# Patient Record
Sex: FEMALE | Race: WHITE | NOT HISPANIC OR LATINO | Employment: FULL TIME | URBAN - METROPOLITAN AREA
[De-identification: names, ages, dates, MRNs, and addresses within clinical notes are randomized per-mention and may not be internally consistent; named-entity substitution may affect disease eponyms.]

---

## 2017-01-11 RX ORDER — TIZANIDINE HYDROCHLORIDE 4 MG/1
4 CAPSULE, GELATIN COATED ORAL 2 TIMES DAILY PRN
Qty: 180 CAPSULE | Refills: 3 | Status: SHIPPED | OUTPATIENT
Start: 2017-01-11 | End: 2017-01-21

## 2017-06-19 ENCOUNTER — TELEPHONE (OUTPATIENT)
Dept: FAMILY MEDICINE | Facility: CLINIC | Age: 52
End: 2017-06-19

## 2017-06-19 RX ORDER — ZOLPIDEM TARTRATE 5 MG/1
5 TABLET ORAL NIGHTLY PRN
Qty: 90 TABLET | Refills: 1 | Status: SHIPPED | OUTPATIENT
Start: 2017-06-19 | End: 2017-08-29 | Stop reason: SDUPTHER

## 2017-08-29 RX ORDER — ZOLPIDEM TARTRATE 5 MG/1
TABLET ORAL
Qty: 15 TABLET | Refills: 5 | Status: SHIPPED | OUTPATIENT
Start: 2017-08-29 | End: 2018-04-18 | Stop reason: SDUPTHER

## 2017-10-04 ENCOUNTER — OFFICE VISIT (OUTPATIENT)
Dept: FAMILY MEDICINE | Facility: CLINIC | Age: 52
End: 2017-10-04
Payer: COMMERCIAL

## 2017-10-04 VITALS
WEIGHT: 152.63 LBS | SYSTOLIC BLOOD PRESSURE: 110 MMHG | BODY MASS INDEX: 27.04 KG/M2 | HEIGHT: 63 IN | DIASTOLIC BLOOD PRESSURE: 80 MMHG | OXYGEN SATURATION: 98 % | HEART RATE: 80 BPM

## 2017-10-04 DIAGNOSIS — M79.7 FIBROMYALGIA: ICD-10-CM

## 2017-10-04 DIAGNOSIS — Z11.59 NEED FOR HEPATITIS C SCREENING TEST: ICD-10-CM

## 2017-10-04 DIAGNOSIS — M19.90 ARTHRITIS: Primary | ICD-10-CM

## 2017-10-04 PROCEDURE — 99214 OFFICE O/P EST MOD 30 MIN: CPT | Mod: S$GLB,,,

## 2017-10-04 PROCEDURE — 99999 PR PBB SHADOW E&M-EST. PATIENT-LVL III: CPT | Mod: PBBFAC,,,

## 2017-10-04 RX ORDER — DULOXETIN HYDROCHLORIDE 20 MG/1
20 CAPSULE, DELAYED RELEASE ORAL DAILY
Qty: 30 CAPSULE | Refills: 11 | Status: SHIPPED | OUTPATIENT
Start: 2017-10-04 | End: 2017-12-05

## 2017-10-04 RX ORDER — SUMATRIPTAN SUCCINATE 100 MG/1
TABLET ORAL
Qty: 9 TABLET | Refills: 11 | Status: SHIPPED | OUTPATIENT
Start: 2017-10-04 | End: 2021-04-01 | Stop reason: SDUPTHER

## 2017-10-04 NOTE — PROGRESS NOTES
Subjective:       Patient ID: Yasmine Villa is a 51 y.o. female.    Chief Complaint: Anxiety and Depression    HPI  The patient is complaining anxiety and depression symptoms. She is having a hard time adjusting to her new job. No change in her personal life.   Review of Systems   Constitutional: Positive for diaphoresis, fatigue and unexpected weight change.   Cardiovascular: Positive for palpitations.   Genitourinary:        Premenstrual    Musculoskeletal: Positive for joint swelling, myalgias and neck pain.        Ankle knees wrist   Neurological: Positive for headaches.   Psychiatric/Behavioral: Positive for sleep disturbance.       Objective:      Vitals:    10/04/17 0724   BP: 110/80   Pulse: 80     Physical Exam   Constitutional: She is oriented to person, place, and time. She appears well-developed and well-nourished. She is active.  Non-toxic appearance. She does not have a sickly appearance. She does not appear ill. No distress.   HENT:   Head: Normocephalic and atraumatic.   Right Ear: External ear normal.   Left Ear: External ear normal.   Nose: Nose normal.   Hearing normal.    Eyes: Conjunctivae are normal. Pupils are equal, round, and reactive to light.   Neck: Normal range of motion. Neck supple. No thyroid mass and no thyromegaly present.   Cardiovascular: Normal rate, regular rhythm, normal heart sounds and intact distal pulses.  Exam reveals no gallop and no friction rub.    No murmur heard.  Pulses:       Dorsalis pedis pulses are 2+ on the right side, and 2+ on the left side.        Posterior tibial pulses are 2+ on the right side, and 2+ on the left side.   Pulmonary/Chest: Effort normal and breath sounds normal. No respiratory distress. She exhibits no tenderness.   Musculoskeletal: Normal range of motion. She exhibits no edema.   Lymphadenopathy:     She has no cervical adenopathy.   Neurological: She is alert and oriented to person, place, and time. She has normal strength. Coordination and  gait normal.   Skin: Skin is warm and dry. She is not diaphoretic. No pallor.   Psychiatric: She has a normal mood and affect. Her speech is normal and behavior is normal. Judgment and thought content normal. Cognition and memory are normal.   Vitals reviewed.      Assessment:       1. Arthritis    2. Need for hepatitis C screening test    3. Fibromyalgia        Plan:       Arthritis  -     CLARI; Future; Expected date: 10/04/2017  -     CBC auto differential; Future; Expected date: 10/04/2017  -     Comprehensive metabolic panel; Future; Expected date: 10/04/2017  -     Uric acid; Future; Expected date: 10/04/2017  -     Sedimentation rate, manual; Future; Expected date: 10/04/2017  -     Lipid panel; Future; Expected date: 10/04/2017  -     Rheumatoid factor; Future; Expected date: 10/04/2017    Need for hepatitis C screening test  -     Hepatitis C antibody; Future; Expected date: 10/04/2017    Fibromyalgia    Other orders  -     duloxetine (CYMBALTA) 20 MG capsule; Take 1 capsule (20 mg total) by mouth once daily.  Dispense: 30 capsule; Refill: 11      Return if symptoms worsen or fail to improve.

## 2017-10-04 NOTE — PATIENT INSTRUCTIONS
Fibromyalgia  Fibromyalgia is a chronic condition. It causes pain and tenderness in connective tissues. This causes muscle pain. Often, there are also many tender areas throughout the body. Symptoms may also include stiffness and feelings of numbness and tingling. Symptoms may be worse upon waking up. They may increase with poor sleep, heavy activity, cold or damp weather, anxiety, or stress.  People with fibromyalgia often feel tired. They may have trouble sleeping. Other symptoms include morning stiffness, headaches, and painful menstrual periods. Some people have problems with thinking clearly and changes in memory.  The cause of fibromyalgia is not known. Symptoms are similar to that of other diseases. These include rheumatoid arthritis, low thyroid, chronic fatigue syndrome, and Lyme disease. In some cases, these diseases may occur together.  Fibromyalgia is often treated with medicines. You and your healthcare provider can discuss the medicine that may work best for you. You may have to try more than one medicine or combination of medicines before you find what works for you.  Home care  · If your healthcare provider has prescribed or recommended medicines, take them as directed.  · Rest as needed. Try to get enough sleep. If you have trouble sleeping, discuss this with your healthcare provider.   · Be active. Regular exercise can help manage symptoms. Some options include walking, swimming, and biking. Strengthening exercises may also be helpful. Talk to your healthcare provider about the best ways to be active.  · Follow a healthy diet. Limit caffeine and alcohol. If you smoke, ask your healthcare provider for help to stop.  · Notice how your body reacts to stress. Learn to listen to your body signals. This will help you take action before the stress becomes severe.  · Learn relaxation techniques. Also consider joining a stress reduction program or class.  · Talk to your healthcare provider about trying  complementary treatments. These include acupuncture, hypnosis, and biofeedback. Yoga and tu chi may be helpful.  · Ask your healthcare provider about cognitive behavioral therapy (CBT). This type of counseling can help people with fibromyalgia cope better with their illness.  Follow-up care  Follow up with your healthcare provider or as advised by our staff. In many cases, fibromyalgia is best treated with a team approach.  This may involve your primary care provider, a rheumatologist, a physical therapist, and a mental health professional.   For more information: [employer] of Arth[employer] and Musculoskeletal and Skin Diseases (NIAMS)  www.niams.nih.gov 264-498-2604  When to seek medical advice  Contact your healthcare provider right away if any of these occur:  · Symptoms getting worse or new symptoms developing  · You feel hopeless, helpless, or lose interest in day-to-day life  Date Last Reviewed: 3/1/2017  © 1248-9939 [employer]. [address] [place] [address]. All rights reserved. This information is not intended as a substitute for professional medical care. Always follow your healthcare professional's instructions.        Managing Fibromyalgia    Fibromyalgia is a chronic illness that causes pain in specific points on the body, stiffness, and fatigue. But it doesnt have to keep you from doing what you enjoy. You can feel better. You and your healthcare provider can work together to develop a plan.  Take medications as directed  You may be given medications to help reduce pain and improve sleep.  Several medications are approved to treat fibromyalgia. Two were originally designed to treat depression. They are duloxetine, and milnacipran. A third, called pregaballin, was developed to treat nerve pain. Other medications include pain relievers such as acetaminophen or stronger narcotics. These may be prescribed short term.  NSAIDs (non-steroidal anti-inflammatory drugs) include  aspirin, ibuprofen and naproxen are used to relieve pain.  Get exercise  Gentle exercise can help lessen your pain. Try these tips:  · Choose activities that are gentle on your joints, such as walking, biking, and swimming or other water exercises.  · Dont push yourself too hard. Build up your strength and endurance slowly, over time.  · Stick to it. For the most relief, exercise should become part of your daily life.  Get a good nights rest  To help you get more sleep, try the tips below:  · Sleep only in a bed, not on a couch or chair.  · Dont watch TV, read, or work in bed.  · Go to bed and get up at the same time each day.  · Try to avoid naps.  · Avoid alcohol, caffeine, and tobacco for at least 3 hours before going to bed.  · Avoid fluids in the evening to avoid having to get up to urinate.  Other things you can do  No one knows what causes fibromyalgia. But stress, poor eating habits, and extra weight can make it worse. These tips may help you feel better:  · Eat a balanced diet with plenty of fruits and vegetables, whole grains, lean protein, and low-fat or nonfat dairy products.  · Maintain a healthy weight.  · Learn ways to reduce or manage the stress in your life.  · Ask your healthcare provider for resources to help you make changes. Or check out the resources below.  Resources  · Arthritis Foundation  www.arthritis.org  · National Fibromyalgia Association  www.fmaware.org  · American Fibromyalgia Syndrome Association  www.afsafund.org  Date Last Reviewed: 2/14/2016 © 2000-2017 [employer]. [address] [place] [address], [place] [address]. All rights reserved. This information is not intended as a substitute for professional medical care. Always follow your healthcare professional's instructions.        Understanding Fibromyalgia     People with fibromyalgia tend to have at least 11 of the 18 tender points shown above.     Fibromyalgia is a condition that causes pain at specific points on the  body, stiffness, and fatigue.  What are the symptoms of fibromyalgia?  In most cases, you will have tender points on your body. These points are very sore, especially when touched. Finding several of these points helps your healthcare provider diagnose fibromyalgia.  Along with the tender points, you may have some or all of the following symptoms:  · Constant tiredness (fatigue), even after a full nights sleep (non-restorative sleep)  · A burning or throbbing pain in many parts of the body (this pain may vary during the day)  · Stiffness or aching all over your body  · Numbness or tingling in your arms and legs  · Trouble sleeping  · Bowel problems (bloating, diarrhea, constipation)  · Headaches  · Depression  How is fibromyalgia diagnosed?  There is no lab test to diagnose fibromyalgia. Instead, your healthcare provider will take your health history and examine your joints and muscles. Certain criteria are used when diagnosing fibromyalgia. Symptoms need to be present for at least 3 months. Tests may be done to rule out other conditions with similar symptoms. Then, together you can design a plan to help you manage your symptoms.   How is fibromyalgia treated?  Several medications are approved to treat fibromyalgia. Two were originally made to treat depression. They are duloxetine, and milnacipran. A third, called pregaballin, was developed to treat nerve pain. Certain medicines used to treat depression have been helpful with fibromyalgia. Other medications include pain relievers such as acetaminophen or stronger narcotics. These may be prescribed short term.  NSAIDs (nonsteroidal anti-inflammatory drugs) including aspirin, ibuprofen, and naproxen are used to relieve pain.  Getting enough sleep, regular exercise, and eating a healthy diet can help manage symptoms. Cognitive behavioral therapy (a form of psychotherapy) can be helpful for fibromyalgia. Some people find alternative treatments such as massage,  chiropractic treatments, biofeedback, or acupuncture also help with symptoms.  Date Last Reviewed: 2/14/2016  © 6430-8401 [employer]. [address] [place] [address], [place] [address]. All rights reserved. This information is not intended as a substitute for professional medical care. Always follow your healthcare professional's instructions.

## 2017-12-05 ENCOUNTER — PATIENT MESSAGE (OUTPATIENT)
Dept: FAMILY MEDICINE | Facility: CLINIC | Age: 52
End: 2017-12-05

## 2017-12-05 RX ORDER — DULOXETIN HYDROCHLORIDE 30 MG/1
30 CAPSULE, DELAYED RELEASE ORAL DAILY
Qty: 30 CAPSULE | Refills: 11 | Status: SHIPPED | OUTPATIENT
Start: 2017-12-05 | End: 2020-09-23

## 2018-04-18 RX ORDER — ZOLPIDEM TARTRATE 5 MG/1
TABLET ORAL
Qty: 15 TABLET | Refills: 5 | Status: SHIPPED | OUTPATIENT
Start: 2018-04-18 | End: 2020-09-23

## 2018-05-24 ENCOUNTER — OFFICE VISIT (OUTPATIENT)
Dept: URGENT CARE | Facility: CLINIC | Age: 53
End: 2018-05-24
Payer: COMMERCIAL

## 2018-05-24 VITALS
HEIGHT: 63 IN | WEIGHT: 152 LBS | OXYGEN SATURATION: 99 % | SYSTOLIC BLOOD PRESSURE: 139 MMHG | TEMPERATURE: 99 F | RESPIRATION RATE: 18 BRPM | DIASTOLIC BLOOD PRESSURE: 88 MMHG | HEART RATE: 85 BPM | BODY MASS INDEX: 26.93 KG/M2

## 2018-05-24 DIAGNOSIS — Z76.89 ESTABLISHING CARE WITH NEW DOCTOR, ENCOUNTER FOR: ICD-10-CM

## 2018-05-24 DIAGNOSIS — J32.9 SINUSITIS, UNSPECIFIED CHRONICITY, UNSPECIFIED LOCATION: Primary | ICD-10-CM

## 2018-05-24 PROCEDURE — 3008F BODY MASS INDEX DOCD: CPT | Mod: CPTII,S$GLB,, | Performed by: NURSE PRACTITIONER

## 2018-05-24 PROCEDURE — 99214 OFFICE O/P EST MOD 30 MIN: CPT | Mod: SA,25,S$GLB, | Performed by: NURSE PRACTITIONER

## 2018-05-24 PROCEDURE — 96372 THER/PROPH/DIAG INJ SC/IM: CPT | Mod: S$GLB,,, | Performed by: FAMILY MEDICINE

## 2018-05-24 RX ORDER — FLUTICASONE PROPIONATE 50 MCG
1 SPRAY, SUSPENSION (ML) NASAL DAILY
Qty: 16 G | Refills: 1 | Status: SHIPPED | OUTPATIENT
Start: 2018-05-24 | End: 2020-10-01

## 2018-05-24 RX ORDER — AZITHROMYCIN 250 MG/1
TABLET, FILM COATED ORAL
Qty: 6 TABLET | Refills: 0 | Status: SHIPPED | OUTPATIENT
Start: 2018-05-24 | End: 2020-09-23

## 2018-05-24 RX ORDER — BETAMETHASONE SODIUM PHOSPHATE AND BETAMETHASONE ACETATE 3; 3 MG/ML; MG/ML
6 INJECTION, SUSPENSION INTRA-ARTICULAR; INTRALESIONAL; INTRAMUSCULAR; SOFT TISSUE
Status: COMPLETED | OUTPATIENT
Start: 2018-05-24 | End: 2018-05-24

## 2018-05-24 RX ORDER — AZELASTINE 1 MG/ML
1 SPRAY, METERED NASAL 2 TIMES DAILY
Qty: 30 ML | Refills: 1 | Status: SHIPPED | OUTPATIENT
Start: 2018-05-24 | End: 2020-09-23

## 2018-05-24 RX ORDER — IPRATROPIUM BROMIDE 21 UG/1
2 SPRAY, METERED NASAL 3 TIMES DAILY PRN
Qty: 30 ML | Refills: 0 | Status: SHIPPED | OUTPATIENT
Start: 2018-05-24 | End: 2018-05-28

## 2018-05-24 RX ADMIN — BETAMETHASONE SODIUM PHOSPHATE AND BETAMETHASONE ACETATE 6 MG: 3; 3 INJECTION, SUSPENSION INTRA-ARTICULAR; INTRALESIONAL; INTRAMUSCULAR; SOFT TISSUE at 09:05

## 2018-05-24 NOTE — PATIENT INSTRUCTIONS
"Please follow up with your Primary care provider within 2-5 days if your signs and symptoms have not resolved or worsen.  The usual course of cold symptoms are 10-14 days.     If your condition worsens or fails to improve we recommend that you receive another evaluation at the emergency room immediately or contact your primary medical clinic to discuss your concerns.     You must understand that you have received an Urgent Care treatment only and that you may be released before all of your medical problems are known or treated.   You, the patient, will arrange for follow up care as instructed.     Tylenol or Ibuprofen can also be used as directed for pain/fever unless you have an allergy to them or medical condition such as stomach ulcers, kidney or liver disease or blood thinners etc for which you should not be taking these type of medications.     Take over the counter cough medication as directed as needed for cough.  You should avoid medications with pseudoephedrine or phenylephrine (any medication with "D") if you have high blood pressure as this can cause an elevation in your blood pressure. Instead consider Corcidin HBP as needed to prevent an elevated blood pressure.     Natural remedies of symptoms (as needed) include humidification, saline nasal sprays, and/or steamy showers.  Increase fluids, warm tea with honey, cough drops as needed.  You may also use salt water gargles for sore throat.    IF you received a steroid shot today - As discussed, this can elevate your blood pressure, elevate your blood sugar, water weight gain, nervous energy, redness to the face and dimpling of the skin at the injection site.       Allergic Rhinitis  Allergic rhinitis is an allergic reaction that affects the nose, and often the eyes. Its often known as nasal allergies. Nasal allergies are often due to things in the environment that are breathed in. Depending what you are sensitive to, nasal allergies may occur only during " certain seasons. Or they may occur year round. Common indoor allergens include house dust mites, mold, cockroaches, and pet dander. Outdoor allergens include pollen from trees, grasses, and weeds.   Symptoms include a drippy, stuffy, and itchy nose. They also include sneezing and red and itchy eyes. You may feel tired more often. Severe allergies may also affect your breathing and trigger a condition called asthma.   Tests can be done to see what allergens are affecting you. You may be referred to an allergy specialist for testing and further evaluation.  Home care  Your healthcare provider may prescribe medicines to help relieve allergy symptoms. These may include oral medicines, nasal sprays, or eye drops.  Ask your provider for advice on how to avoid substances that you are allergic to. Below are a few tips for each type of allergen.  Pet dander:  · Do not have pets with fur and feathers.  · If you can't avoid having a pet, keep it out of your bedroom and off upholstered furniture.  Pollen:  · When pollen counts are high, keep windows of your car and home closed. If possible, use an air conditioner instead.  · Wear a filter mask when mowing or doing yard work.  House dust mites:  · Wash bedding every week in warm water and detergent and dry on a hot setting.  · Cover the mattress, box spring, and pillows with allergy covers.   · If possible, sleep in a room with no carpet, curtains, or upholstered furniture.  Cockroaches:  · Store food in sealed containers.  · Remove garbage from the home promptly.  · Fix water leaks  Mold:  · Keep humidity low by using a dehumidifier or air conditioner. Keep the dehumidifier and air conditioner clean and free of mold.  · Clean moldy areas with bleach and water.  In general:  · Vacuum once or twice a week. If possible, use a vacuum with a high-efficiency particulate air (HEPA) filter.  · Do not smoke. Avoid cigarette smoke. Cigarette smoke is an irritant that can make symptoms  worse.  Follow-up care  Follow up as advised by the healthcare provider or our staff. If you were referred to an allergy specialist, make this appointment promptly.  When to seek medical advice  Call your healthcare provider right away if the following occur:  · Coughing or wheezing  · Fever greater than 100.4°F (38°C)  · Hives (raised red bumps)  · Continuing symptoms, new symptoms, or worsening symptoms  Call 911 right away if you have:  · Trouble breathing  · Severe swelling of the face or severe itching of the eyes or mouth  Date Last Reviewed: 3/1/2017  © 7461-2206 [employer]. [address] [place], [place] [address]. All rights reserved. This information is not intended as a substitute for professional medical care. Always follow your healthcare professional's instructions.        Sinusitis (Antibiotic Treatment)    The sinuses are air-filled spaces within the bones of the face. They connect to the inside of the nose. Sinusitis is an inflammation of the tissue lining the sinus cavity. Sinus inflammation can occur during a cold. It can also be due to allergies to pollens and other particles in the air. Sinusitis can cause symptoms of sinus congestion and fullness. A sinus infection causes fever, headache and facial pain. There is often green or yellow drainage from the nose or into the back of the throat (post-nasal drip). You have been given antibiotics to treat this condition.  Home care:  · Take the full course of antibiotics as instructed. Do not stop taking them, even if you feel better.  · Drink plenty of water, hot tea, and other liquids. This may help thin mucus. It also may promote sinus drainage.  · Heat may help soothe painful areas of the face. Use a towel soaked in hot water. Or,  the shower and direct the hot spray onto your face. Using a vaporizer along with a menthol rub at night may also help.   · An expectorant containing guaifenesin may help thin the mucus and promote  drainage from the sinuses.  · Over-the-counter decongestants may be used unless a similar medicine was prescribed. Nasal sprays work the fastest. Use one that contains phenylephrine or oxymetazoline. First blow the nose gently. Then use the spray. Do not use these medicines more often than directed on the label or symptoms may get worse. You may also use tablets containing pseudoephedrine. Avoid products that combine ingredients, because side effects may be increased. Read labels. You can also ask the pharmacist for help. (NOTE: Persons with high blood pressure should not use decongestants. They can raise blood pressure.)  · Over-the-counter antihistamines may help if allergies contributed to your sinusitis.    · Do not use nasal rinses or irrigation during an acute sinus infection, unless told to by your health care provider. Rinsing may spread the infection to other sinuses.  · Use acetaminophen or ibuprofen to control pain, unless another pain medicine was prescribed. (If you have chronic liver or kidney disease or ever had a stomach ulcer, talk with your doctor before using these medicines. Aspirin should never be used in anyone under 18 years of age who is ill with a fever. It may cause severe liver damage.)  · Don't smoke. This can worsen symptoms.  Follow-up care  Follow up with your healthcare provider or our staff if you are not improving within the next week.  When to seek medical advice  Call your healthcare provider if any of these occur:  · Facial pain or headache becoming more severe  · Stiff neck  · Unusual drowsiness or confusion  · Swelling of the forehead or eyelids  · Vision problems, including blurred or double vision  · Fever of 100.4ºF (38ºC) or higher, or as directed by your healthcare provider  · Seizure  · Breathing problems  · Symptoms not resolving within 10 days  Date Last Reviewed: 4/13/2015  © 7518-5294 [employer]. [address] [place] [address], [place] [address]. All rights  reserved. This information is not intended as a substitute for professional medical care. Always follow your healthcare professional's instructions.

## 2018-05-24 NOTE — PROGRESS NOTES
"Subjective:       Patient ID: Yasmine Villa is a 52 y.o. female.    Vitals:  height is 5' 3" (1.6 m) and weight is 68.9 kg (152 lb). Her temperature is 98.8 °F (37.1 °C). Her blood pressure is 139/88 and her pulse is 85. Her respiration is 18 and oxygen saturation is 99%.     Chief Complaint: Sinus Problem    This is a 52 y.o. female with Past Medical History:  No date: Migraines   Past Surgical History:  No date: BREAST SURGERY  No date:  SECTION  who presents today with a chief complaint of sinus issues for two weeks.         Sinus Problem   This is a new problem. The current episode started 1 to 4 weeks ago. The problem is unchanged. There has been no fever. Associated symptoms include congestion, coughing, headaches, a hoarse voice, sinus pressure and sneezing. Pertinent negatives include no chills, diaphoresis, ear pain, neck pain, shortness of breath, sore throat or swollen glands. Past treatments include oral decongestants. The treatment provided no relief.     Review of Systems   Constitution: Negative for chills and diaphoresis.   HENT: Positive for congestion, hoarse voice, sinus pressure and sneezing. Negative for ear pain and sore throat.         Ear popping    Respiratory: Positive for cough. Negative for shortness of breath.    Musculoskeletal: Negative for neck pain.   Neurological: Positive for headaches.       Objective:      Physical Exam   Constitutional: She is oriented to person, place, and time. She appears well-developed and well-nourished. She is active and cooperative.  Non-toxic appearance. She does not appear ill. No distress.   HENT:   Head: Normocephalic and atraumatic.   Right Ear: Hearing, tympanic membrane, external ear and ear canal normal.   Left Ear: Hearing, tympanic membrane, external ear and ear canal normal.   Nose: Mucosal edema and rhinorrhea present. No nasal deformity. No epistaxis. Right sinus exhibits maxillary sinus tenderness and frontal sinus tenderness. Left sinus " exhibits maxillary sinus tenderness and frontal sinus tenderness.   Mouth/Throat: Uvula is midline and mucous membranes are normal. No trismus in the jaw. Normal dentition. No uvula swelling. Oropharyngeal exudate present. No posterior oropharyngeal edema or posterior oropharyngeal erythema.   Eyes: Conjunctivae and lids are normal. No scleral icterus.   Sclera clear bilat   Neck: Trachea normal, full passive range of motion without pain and phonation normal. Neck supple.   Cardiovascular: Normal rate, regular rhythm, normal heart sounds, intact distal pulses and normal pulses.    Pulmonary/Chest: Effort normal and breath sounds normal. No respiratory distress. She has no decreased breath sounds. She has no wheezes. She has no rhonchi. She has no rales.   Abdominal: Soft. Normal appearance and bowel sounds are normal. She exhibits no distension. There is no tenderness.   Musculoskeletal: Normal range of motion. She exhibits no edema or deformity.   Lymphadenopathy:     She has no cervical adenopathy.        Right cervical: No superficial cervical, no deep cervical and no posterior cervical adenopathy present.       Left cervical: No superficial cervical, no deep cervical and no posterior cervical adenopathy present.   Neurological: She is alert and oriented to person, place, and time. She exhibits normal muscle tone. Coordination normal.   Skin: Skin is warm, dry and intact. Capillary refill takes less than 2 seconds. No rash noted. She is not diaphoretic. No cyanosis. No pallor. Nails show no clubbing.   Psychiatric: She has a normal mood and affect. Her speech is normal and behavior is normal. Judgment and thought content normal. Cognition and memory are normal.   Nursing note and vitals reviewed.      Assessment:       1. Sinusitis, unspecified chronicity, unspecified location    2. Establishing care with new doctor, encounter for        Plan:         Sinusitis, unspecified chronicity, unspecified location  -      "betamethasone acetate-betamethasone sodium phosphate injection 6 mg; Inject 1 mL (6 mg total) into the muscle one time.    Establishing care with new doctor, encounter for  -     Ambulatory Referral to Family Practice  -     azithromycin (ZITHROMAX Z-TYLER) 250 MG tablet; Take 2 tablets (500 mg) on  Day 1,  followed by 1 tablet (250 mg) once daily on Days 2 through 5.  Dispense: 6 tablet; Refill: 0  -     fluticasone (FLONASE) 50 mcg/actuation nasal spray; 1 spray (50 mcg total) by Each Nare route once daily.  Dispense: 16 g; Refill: 1  -     ipratropium (ATROVENT) 0.03 % nasal spray; 2 sprays by Nasal route 3 (three) times daily as needed. Use no more than 4 days.  Dispense: 30 mL; Refill: 0  -     azelastine (ASTELIN) 137 mcg (0.1 %) nasal spray; 1 spray (137 mcg total) by Nasal route 2 (two) times daily.  Dispense: 30 mL; Refill: 1      Patient Instructions   Please follow up with your Primary care provider within 2-5 days if your signs and symptoms have not resolved or worsen.  The usual course of cold symptoms are 10-14 days.     If your condition worsens or fails to improve we recommend that you receive another evaluation at the emergency room immediately or contact your primary medical clinic to discuss your concerns.     You must understand that you have received an Urgent Care treatment only and that you may be released before all of your medical problems are known or treated.   You, the patient, will arrange for follow up care as instructed.     Tylenol or Ibuprofen can also be used as directed for pain/fever unless you have an allergy to them or medical condition such as stomach ulcers, kidney or liver disease or blood thinners etc for which you should not be taking these type of medications.     Take over the counter cough medication as directed as needed for cough.  You should avoid medications with pseudoephedrine or phenylephrine (any medication with "D") if you have high blood pressure as this can " cause an elevation in your blood pressure. Instead consider Corcidin HBP as needed to prevent an elevated blood pressure.     Natural remedies of symptoms (as needed) include humidification, saline nasal sprays, and/or steamy showers.  Increase fluids, warm tea with honey, cough drops as needed.  You may also use salt water gargles for sore throat.    IF you received a steroid shot today - As discussed, this can elevate your blood pressure, elevate your blood sugar, water weight gain, nervous energy, redness to the face and dimpling of the skin at the injection site.       Allergic Rhinitis  Allergic rhinitis is an allergic reaction that affects the nose, and often the eyes. Its often known as nasal allergies. Nasal allergies are often due to things in the environment that are breathed in. Depending what you are sensitive to, nasal allergies may occur only during certain seasons. Or they may occur year round. Common indoor allergens include house dust mites, mold, cockroaches, and pet dander. Outdoor allergens include pollen from trees, grasses, and weeds.   Symptoms include a drippy, stuffy, and itchy nose. They also include sneezing and red and itchy eyes. You may feel tired more often. Severe allergies may also affect your breathing and trigger a condition called asthma.   Tests can be done to see what allergens are affecting you. You may be referred to an allergy specialist for testing and further evaluation.  Home care  Your healthcare provider may prescribe medicines to help relieve allergy symptoms. These may include oral medicines, nasal sprays, or eye drops.  Ask your provider for advice on how to avoid substances that you are allergic to. Below are a few tips for each type of allergen.  Pet dander:  · Do not have pets with fur and feathers.  · If you can't avoid having a pet, keep it out of your bedroom and off upholstered furniture.  Pollen:  · When pollen counts are high, keep windows of your car and  home closed. If possible, use an air conditioner instead.  · Wear a filter mask when mowing or doing yard work.  House dust mites:  · Wash bedding every week in warm water and detergent and dry on a hot setting.  · Cover the mattress, box spring, and pillows with allergy covers.   · If possible, sleep in a room with no carpet, curtains, or upholstered furniture.  Cockroaches:  · Store food in sealed containers.  · Remove garbage from the home promptly.  · Fix water leaks  Mold:  · Keep humidity low by using a dehumidifier or air conditioner. Keep the dehumidifier and air conditioner clean and free of mold.  · Clean moldy areas with bleach and water.  In general:  · Vacuum once or twice a week. If possible, use a vacuum with a high-efficiency particulate air (HEPA) filter.  · Do not smoke. Avoid cigarette smoke. Cigarette smoke is an irritant that can make symptoms worse.  Follow-up care  Follow up as advised by the healthcare provider or our staff. If you were referred to an allergy specialist, make this appointment promptly.  When to seek medical advice  Call your healthcare provider right away if the following occur:  · Coughing or wheezing  · Fever greater than 100.4°F (38°C)  · Hives (raised red bumps)  · Continuing symptoms, new symptoms, or worsening symptoms  Call 911 right away if you have:  · Trouble breathing  · Severe swelling of the face or severe itching of the eyes or mouth  Date Last Reviewed: 3/1/2017  © 3141-6772 [employer] [address] [place] [address]. All rights reserved. This information is not intended as a substitute for professional medical care. Always follow your healthcare professional's instructions.        Sinusitis (Antibiotic Treatment)    The sinuses are air-filled spaces within the bones of the face. They connect to the inside of the nose. Sinusitis is an inflammation of the tissue lining the sinus cavity. Sinus inflammation can occur during a cold. It can  also be due to allergies to pollens and other particles in the air. Sinusitis can cause symptoms of sinus congestion and fullness. A sinus infection causes fever, headache and facial pain. There is often green or yellow drainage from the nose or into the back of the throat (post-nasal drip). You have been given antibiotics to treat this condition.  Home care:  · Take the full course of antibiotics as instructed. Do not stop taking them, even if you feel better.  · Drink plenty of water, hot tea, and other liquids. This may help thin mucus. It also may promote sinus drainage.  · Heat may help soothe painful areas of the face. Use a towel soaked in hot water. Or,  the shower and direct the hot spray onto your face. Using a vaporizer along with a menthol rub at night may also help.   · An expectorant containing guaifenesin may help thin the mucus and promote drainage from the sinuses.  · Over-the-counter decongestants may be used unless a similar medicine was prescribed. Nasal sprays work the fastest. Use one that contains phenylephrine or oxymetazoline. First blow the nose gently. Then use the spray. Do not use these medicines more often than directed on the label or symptoms may get worse. You may also use tablets containing pseudoephedrine. Avoid products that combine ingredients, because side effects may be increased. Read labels. You can also ask the pharmacist for help. (NOTE: Persons with high blood pressure should not use decongestants. They can raise blood pressure.)  · Over-the-counter antihistamines may help if allergies contributed to your sinusitis.    · Do not use nasal rinses or irrigation during an acute sinus infection, unless told to by your health care provider. Rinsing may spread the infection to other sinuses.  · Use acetaminophen or ibuprofen to control pain, unless another pain medicine was prescribed. (If you have chronic liver or kidney disease or ever had a stomach ulcer, talk with  your doctor before using these medicines. Aspirin should never be used in anyone under 18 years of age who is ill with a fever. It may cause severe liver damage.)  · Don't smoke. This can worsen symptoms.  Follow-up care  Follow up with your healthcare provider or our staff if you are not improving within the next week.  When to seek medical advice  Call your healthcare provider if any of these occur:  · Facial pain or headache becoming more severe  · Stiff neck  · Unusual drowsiness or confusion  · Swelling of the forehead or eyelids  · Vision problems, including blurred or double vision  · Fever of 100.4ºF (38ºC) or higher, or as directed by your healthcare provider  · Seizure  · Breathing problems  · Symptoms not resolving within 10 days  Date Last Reviewed: 4/13/2015  © 3711-2915 [employer]. [address] [place] [address], [place] [address]. All rights reserved. This information is not intended as a substitute for professional medical care. Always follow your healthcare professional's instructions.

## 2018-08-14 DIAGNOSIS — Z12.31 SCREENING MAMMOGRAM, ENCOUNTER FOR: Primary | ICD-10-CM

## 2018-12-21 ENCOUNTER — PATIENT MESSAGE (OUTPATIENT)
Dept: ADMINISTRATIVE | Facility: HOSPITAL | Age: 53
End: 2018-12-21

## 2019-09-18 DIAGNOSIS — Z12.31 VISIT FOR SCREENING MAMMOGRAM: Primary | ICD-10-CM

## 2020-09-23 ENCOUNTER — OFFICE VISIT (OUTPATIENT)
Dept: FAMILY MEDICINE | Facility: CLINIC | Age: 55
End: 2020-09-23
Payer: COMMERCIAL

## 2020-09-23 VITALS
HEART RATE: 85 BPM | BODY MASS INDEX: 31.57 KG/M2 | DIASTOLIC BLOOD PRESSURE: 100 MMHG | WEIGHT: 178.25 LBS | OXYGEN SATURATION: 97 % | TEMPERATURE: 98 F | SYSTOLIC BLOOD PRESSURE: 152 MMHG

## 2020-09-23 DIAGNOSIS — E66.09 CLASS 1 OBESITY DUE TO EXCESS CALORIES WITHOUT SERIOUS COMORBIDITY WITH BODY MASS INDEX (BMI) OF 31.0 TO 31.9 IN ADULT: ICD-10-CM

## 2020-09-23 DIAGNOSIS — Z11.4 SCREENING FOR HIV (HUMAN IMMUNODEFICIENCY VIRUS): ICD-10-CM

## 2020-09-23 DIAGNOSIS — Z12.31 ENCOUNTER FOR SCREENING MAMMOGRAM FOR MALIGNANT NEOPLASM OF BREAST: ICD-10-CM

## 2020-09-23 DIAGNOSIS — Z00.00 ANNUAL PHYSICAL EXAM: Primary | ICD-10-CM

## 2020-09-23 DIAGNOSIS — Z12.11 SCREENING FOR COLON CANCER: ICD-10-CM

## 2020-09-23 DIAGNOSIS — F51.01 PRIMARY INSOMNIA: ICD-10-CM

## 2020-09-23 DIAGNOSIS — J30.9 ALLERGIC RHINITIS, UNSPECIFIED SEASONALITY, UNSPECIFIED TRIGGER: ICD-10-CM

## 2020-09-23 DIAGNOSIS — I10 ESSENTIAL HYPERTENSION: ICD-10-CM

## 2020-09-23 DIAGNOSIS — F32.A ANXIETY AND DEPRESSION: ICD-10-CM

## 2020-09-23 DIAGNOSIS — F41.9 ANXIETY AND DEPRESSION: ICD-10-CM

## 2020-09-23 DIAGNOSIS — Z01.419 WELL WOMAN EXAM: ICD-10-CM

## 2020-09-23 DIAGNOSIS — G43.009 MIGRAINE WITHOUT AURA AND WITHOUT STATUS MIGRAINOSUS, NOT INTRACTABLE: ICD-10-CM

## 2020-09-23 PROBLEM — E66.811 CLASS 1 OBESITY DUE TO EXCESS CALORIES WITHOUT SERIOUS COMORBIDITY WITH BODY MASS INDEX (BMI) OF 31.0 TO 31.9 IN ADULT: Status: ACTIVE | Noted: 2020-09-23

## 2020-09-23 PROBLEM — G47.00 INSOMNIA: Status: ACTIVE | Noted: 2018-10-08

## 2020-09-23 PROCEDURE — 99386 PREV VISIT NEW AGE 40-64: CPT | Mod: S$GLB,,, | Performed by: INTERNAL MEDICINE

## 2020-09-23 PROCEDURE — 99999 PR PBB SHADOW E&M-EST. PATIENT-LVL III: CPT | Mod: PBBFAC,,, | Performed by: INTERNAL MEDICINE

## 2020-09-23 PROCEDURE — 99999 PR PBB SHADOW E&M-EST. PATIENT-LVL III: ICD-10-PCS | Mod: PBBFAC,,, | Performed by: INTERNAL MEDICINE

## 2020-09-23 PROCEDURE — 99386 PR PREVENTIVE VISIT,NEW,40-64: ICD-10-PCS | Mod: S$GLB,,, | Performed by: INTERNAL MEDICINE

## 2020-09-23 RX ORDER — TEMAZEPAM 30 MG/1
30 CAPSULE ORAL NIGHTLY PRN
Qty: 30 CAPSULE | Refills: 5 | Status: SHIPPED | OUTPATIENT
Start: 2020-09-23 | End: 2021-04-01

## 2020-09-23 RX ORDER — TEMAZEPAM 30 MG/1
30 CAPSULE ORAL NIGHTLY PRN
COMMUNITY
End: 2020-09-23 | Stop reason: SDUPTHER

## 2020-09-23 NOTE — ASSESSMENT & PLAN NOTE
Stable on temazepam PRN with melatonin.  Was on Ambien in past but it quit working..  No issues at present.  Has been taking since seeing previous PCP.

## 2020-09-23 NOTE — PROGRESS NOTES
[facility] Note    Chief Complaint      Chief Complaint   Patient presents with    Annual Exam     History of Present Illness      Yasmine Villa is a 54 y.o. female who presents today annual preventative visit.  Patient comes to appointment alone.     Problem List Items Addressed This Visit     Migraine without aura and without status migrainosus, not intractable    Current Assessment & Plan     Stable on imitrex PRN, gets 2-3 migraines per month.  Takes ibuprofen sometimes.         Anxiety and depression    Current Assessment & Plan     No longer on cymbalta, no panic attacks/SI/HI.         Insomnia    Current Assessment & Plan     Stable on temazepam PRN with melatonin.  Was on Ambien in past but it quit working..  No issues at present.  Has been taking since seeing previous PCP.         Allergic rhinitis    Current Assessment & Plan     Stable on zyrtec and flonase, works pretty well for her.         Class 1 obesity due to excess calories without serious comorbidity with body mass index (BMI) of 31.0 to 31.9 in adult    Current Assessment & Plan     Rides stationary bike 30 minutes a few times per week, tries to watch portions, eat healthy.         Essential hypertension    Current Assessment & Plan     Hasn't been checking at home.  Does not watch sodium.  Bp high at previous PCP.            Other Visit Diagnoses     Annual physical exam    -  Primary    Relevant Orders    CBC auto differential    Lipid Panel    Comprehensive metabolic panel    TSH    Encounter for screening mammogram for malignant neoplasm of breast        Relevant Orders    Mammo Digital Screening Bilat w/ Guille    Screening for HIV (human immunodeficiency virus)        Relevant Orders    HIV 1/2 Ag/Ab (4th Gen)    Well woman exam        Relevant Orders    Ambulatory referral/consult to Obstetrics / Gynecology    Screening for colon cancer        Relevant Orders    Case request GI: COLONOSCOPY (Completed)          [facility]  Maintenance   Topic Date Due    TETANUS VACCINE  2021 (Originally 1982)    Mammogram  10/21/2021    Lipid Panel  10/04/2022    Hepatitis C Screening  Completed       Past Medical History:   Diagnosis Date    Migraines        Past Surgical History:   Procedure Laterality Date    AUGMENTATION OF BREAST  1999    BREAST SURGERY       SECTION         family history includes Menstrual problems in her mother.    Social History     Tobacco Use    Smoking status: Never Smoker    Smokeless tobacco: Never Used   Substance Use Topics    Alcohol use: No    Drug use: No       Review of Systems   Constitutional: Negative for chills and fever.   HENT: Negative for congestion and sore throat.    Eyes: Negative for blurred vision and discharge.   Respiratory: Negative for cough and shortness of breath.    Cardiovascular: Negative for chest pain and palpitations.   Gastrointestinal: Negative for constipation, diarrhea, nausea and vomiting.   Genitourinary: Negative for dysuria and hematuria.   Musculoskeletal: Negative for falls and myalgias.   Skin: Negative for itching and rash.   Neurological: Negative for dizziness and headaches.        Outpatient Encounter Medications as of 2020   Medication Sig Dispense Refill    fluticasone (FLONASE) 50 mcg/actuation nasal spray 1 spray (50 mcg total) by Each Nare route once daily. 16 g 1    sumatriptan (IMITREX) 100 MG tablet TAKE ONE TABLET BY MOUTH ONCE DAILY AS DIRECTED 9 tablet 11    temazepam (RESTORIL) 30 mg capsule Take 30 mg by mouth nightly as needed for Insomnia.      [DISCONTINUED] azelastine (ASTELIN) 137 mcg (0.1 %) nasal spray 1 spray (137 mcg total) by Nasal route 2 (two) times daily. (Patient not taking: Reported on 2020) 30 mL 1    [DISCONTINUED] azithromycin (ZITHROMAX Z-TYLER) 250 MG tablet Take 2 tablets (500 mg) on  Day 1,  followed by 1 tablet (250 mg) once daily on Days 2 through 5. (Patient not taking: Reported on  9/23/2020) 6 tablet 0    [DISCONTINUED] DULoxetine (CYMBALTA) 30 MG capsule Take 1 capsule (30 mg total) by mouth once daily. (Patient not taking: Reported on 9/23/2020) 30 capsule 11    [DISCONTINUED] sumatriptan (IMITREX) 100 MG tablet TAKE ONE TABLET BY MOUTH AS DIRECTED (Patient not taking: Reported on 9/23/2020) 9 tablet 12    [DISCONTINUED] zolpidem (AMBIEN) 5 MG Tab TAKE ONE TABLET BY MOUTH NIGHTLY AS NEEDED (Patient not taking: Reported on 9/23/2020) 15 tablet 5     No facility-administered encounter medications on file as of 9/23/2020.         Review of patient's allergies indicates:   Allergen Reactions    Pcn [penicillins]     Wellbutrin [bupropion hcl]      Hair loss       Physical Exam      Vital Signs  Temp: 97.5 °F (36.4 °C)  Temp src: Oral  Pulse: 85  SpO2: 97 %  BP: (!) 152/100  BP Location: Left arm  Patient Position: Sitting  Pain Score: 0-No pain  Height and Weight  Weight: 80.8 kg (178 lb 3.9 oz)]  Body mass index is 31.57 kg/m².    Physical Exam  Constitutional:       Appearance: She is well-developed.   HENT:      Head: Normocephalic and atraumatic.      Right Ear: External ear normal.      Left Ear: External ear normal.   Eyes:      General:         Right eye: No discharge.         Left eye: No discharge.   Neck:      Musculoskeletal: Normal range of motion.      Thyroid: No thyromegaly.   Cardiovascular:      Rate and Rhythm: Normal rate and regular rhythm.      Heart sounds: Normal heart sounds. No murmur.   Pulmonary:      Effort: Pulmonary effort is normal. No respiratory distress.      Breath sounds: Normal breath sounds.   Abdominal:      General: Bowel sounds are normal. There is no distension.      Palpations: Abdomen is soft.      Tenderness: There is no abdominal tenderness.   Musculoskeletal: Normal range of motion.         General: No deformity.   Skin:     General: Skin is warm and dry.      Findings: No rash.   Neurological:      Mental Status: She is alert and oriented to  person, place, and time.   Psychiatric:         Behavior: Behavior normal.          Laboratory:  CBC:  No results for input(s): WBC, RBC, HGB, HCT, PLT, MCV, MCH, MCHC in the last 2160 hours.  CMP:  No results for input(s): GLU, CALCIUM, ALBUMIN, PROT, NA, K, CO2, CL, BUN, ALKPHOS, ALT, AST, BILITOT in the last 2160 hours.    Invalid input(s): CREATININ  URINALYSIS:  No results for input(s): COLORU, CLARITYU, SPECGRAV, PHUR, PROTEINUA, GLUCOSEU, BILIRUBINCON, BLOODU, WBCU, RBCU, BACTERIA, MUCUS, NITRITE, LEUKOCYTESUR, UROBILINOGEN, HYALINECASTS in the last 2160 hours.   LIPIDS:  No results for input(s): TSH, HDL, CHOL, TRIG, LDLCALC, CHOLHDL, NONHDLCHOL, TOTALCHOLEST in the last 2160 hours.  TSH:  No results for input(s): TSH in the last 2160 hours.  A1C:  No results for input(s): HGBA1C in the last 2160 hours.    Radiology:  No new imaging on file    Assessment/Plan     Yasmine Villa is a 54 y.o.female with:    1. Annual physical exam  - CBC auto differential; Future  - Lipid Panel; Future  - Comprehensive metabolic panel; Future  - TSH; Future    2. Primary insomnia    3. Anxiety and depression    4. Essential hypertension    5. Allergic rhinitis, unspecified seasonality, unspecified trigger    6. Migraine without aura and without status migrainosus, not intractable    7. Class 1 obesity due to excess calories without serious comorbidity with body mass index (BMI) of 31.0 to 31.9 in adult    8. Encounter for screening mammogram for malignant neoplasm of breast  - Mammo Digital Screening Bilat w/ Guille; Future    9. Screening for HIV (human immunodeficiency virus)  - HIV 1/2 Ag/Ab (4th Gen); Future    10. Well woman exam  - Ambulatory referral/consult to Obstetrics / Gynecology; Future    11. Screening for colon cancer  - Case request GI: COLONOSCOPY    -Will check BP at home BID for next week and message me numbers, counseled on low sodium diet and increasing exercise  -MMG/labs at [facility]  -refer for colonoscopy    -referred to Dr. Hawthorne for PAP  -Continue current medications and maintain follow up with specialists.  Return to clinic 6 months      Lynda Bobo MD  [facility]

## 2020-09-28 ENCOUNTER — TELEPHONE (OUTPATIENT)
Dept: OBSTETRICS AND GYNECOLOGY | Facility: CLINIC | Age: 55
End: 2020-09-28

## 2020-09-28 NOTE — TELEPHONE ENCOUNTER
----- Message from Amanda Nicole sent at 9/28/2020 10:49 AM CDT -----  Contact: [phone]  Type:  Needs Medical Advice    Who Called:  PT  Reason for call: regarding the pt's Co pay for her appt  Would the patient rather a call back or a response via [facility]?  Call back  Best Call Back Number: [phone]

## 2020-09-28 NOTE — TELEPHONE ENCOUNTER
----- Message from Halina Houser sent at 9/28/2020  3:57 PM CDT -----  Contact: pt, [phone]  Patient called in returning your call. Please advise.

## 2020-09-30 ENCOUNTER — PATIENT MESSAGE (OUTPATIENT)
Dept: FAMILY MEDICINE | Facility: CLINIC | Age: 55
End: 2020-09-30

## 2020-10-01 ENCOUNTER — TELEPHONE (OUTPATIENT)
Dept: FAMILY MEDICINE | Facility: CLINIC | Age: 55
End: 2020-10-01

## 2020-10-01 ENCOUNTER — OFFICE VISIT (OUTPATIENT)
Dept: OBSTETRICS AND GYNECOLOGY | Facility: CLINIC | Age: 55
End: 2020-10-01
Payer: COMMERCIAL

## 2020-10-01 VITALS
BODY MASS INDEX: 31.47 KG/M2 | SYSTOLIC BLOOD PRESSURE: 128 MMHG | HEIGHT: 63 IN | DIASTOLIC BLOOD PRESSURE: 78 MMHG | WEIGHT: 177.63 LBS

## 2020-10-01 DIAGNOSIS — Z01.419 WELL WOMAN EXAM: Primary | ICD-10-CM

## 2020-10-01 DIAGNOSIS — Z12.31 ENCOUNTER FOR SCREENING MAMMOGRAM FOR MALIGNANT NEOPLASM OF BREAST: ICD-10-CM

## 2020-10-01 DIAGNOSIS — N95.1 PERIMENOPAUSE: ICD-10-CM

## 2020-10-01 PROCEDURE — 99386 PR PREVENTIVE VISIT,NEW,40-64: ICD-10-PCS | Mod: S$GLB,,, | Performed by: OBSTETRICS & GYNECOLOGY

## 2020-10-01 PROCEDURE — 99999 PR PBB SHADOW E&M-EST. PATIENT-LVL IV: ICD-10-PCS | Mod: PBBFAC,,, | Performed by: OBSTETRICS & GYNECOLOGY

## 2020-10-01 PROCEDURE — 87624 HPV HI-RISK TYP POOLED RSLT: CPT

## 2020-10-01 PROCEDURE — 88175 CYTOPATH C/V AUTO FLUID REDO: CPT

## 2020-10-01 PROCEDURE — 99999 PR PBB SHADOW E&M-EST. PATIENT-LVL IV: CPT | Mod: PBBFAC,,, | Performed by: OBSTETRICS & GYNECOLOGY

## 2020-10-01 PROCEDURE — 99386 PREV VISIT NEW AGE 40-64: CPT | Mod: S$GLB,,, | Performed by: OBSTETRICS & GYNECOLOGY

## 2020-10-01 RX ORDER — FLUTICASONE PROPIONATE 50 MCG
1 SPRAY, SUSPENSION (ML) NASAL DAILY
Qty: 16 G | Refills: 1 | COMMUNITY
Start: 2020-10-01

## 2020-10-01 RX ORDER — PROGESTERONE 100 MG/1
100 CAPSULE ORAL NIGHTLY
Qty: 90 CAPSULE | Refills: 3 | Status: SHIPPED | OUTPATIENT
Start: 2020-10-01 | End: 2021-10-05

## 2020-10-01 RX ORDER — ESTRADIOL 0.75 MG/.75G
1 GEL TOPICAL DAILY
Qty: 30 PACKET | Refills: 11 | Status: SHIPPED | OUTPATIENT
Start: 2020-10-01 | End: 2020-10-05

## 2020-10-01 RX ORDER — PROGESTERONE 200 MG/1
200 CAPSULE ORAL NIGHTLY
Qty: 90 CAPSULE | Refills: 3 | Status: CANCELLED | OUTPATIENT
Start: 2020-10-01 | End: 2021-10-01

## 2020-10-01 NOTE — PROGRESS NOTES
Chief Complaint: Well Woman Exam     HPI:      Yasmine Villa is a 54 y.o.  who presents for annual exam. She is currently complaining of vasomotor symptoms x 5 months and weight gain.  She denies a change in activity and dietary habits. The hot flashes are bothersome and interfere with sleep. Also notes increased fatigue.     Ms. Villa is not currently sexually active. She declines STD screening today. Patient does not have regular monthly menses. No LMP recorded. Patient is premenopausal. She is currently using no method for contraception.    Previous Pap:  no abnormalities (No result found)  Previous Mammogram:   Results for orders placed during the hospital encounter of 10/21/19   Mammo Digital Screening Bilat w/ Guille    Narrative Result:  Mammo Digital Screening Bilat w/ Guille    History:  Patient is 53 y.o. and is seen for a screening mammogram.    Films Compared:  Prior images (if available) were compared.    Findings:  Computer-aided detection was utilized in the interpretation of this   examination. This procedure was performed using tomosynthesis.       The breasts are heterogeneously dense, which may obscure small masses.   There is no evidence of suspicious masses, microcalcifications or   architectural distortion.      Impression No mammographic evidence of malignancy.    BI-RADS Category 1: Negative    Recommendation:  Routine screening mammogram in 1 year is recommended.    The patient's estimated lifetime risk of breast cancer (to age 85) based   on Tyrer-Cuzick - 7 risk assessment model is: Tyrer-Cuzick: 12.58 %.   According to the American Cancer Society,  patients with a lifetime breast   cancer risk of 20% or higher might benefit from supplemental screening   tests.      Most Recent Dexa: never had  Colonoscopy: never had, order pending    Gardasil:Has never had     OB History        1    Para   1    Term   1            AB        Living   1       SAB        TAB        Ectopic      "   Multiple        Live Births               Obstetric Comments   13/R/4  No abnormal pap  No STDs  Menopause 54             ROS:     Review of Systems   Constitutional: Negative for activity change, fatigue and unexpected weight change.   Respiratory: Negative for shortness of breath.    Cardiovascular: Negative for chest pain.   Gastrointestinal: Negative for abdominal pain, blood in stool, constipation and diarrhea.   Endocrine: Negative for cold intolerance and heat intolerance.   Genitourinary: Negative for bladder incontinence, dyspareunia, dysuria, frequency, hot flashes, vaginal bleeding and vaginal dryness.   Integumentary:  Negative for breast mass, breast discharge and breast tenderness.   Psychiatric/Behavioral: Negative for dysphoric mood. The patient is not nervous/anxious.    Breast: Negative for mass and tenderness      Physical Exam:      PHYSICAL EXAM:  /78   Ht 5' 3" (1.6 m)   Wt 80.6 kg (177 lb 9.6 oz)   BMI 31.46 kg/m²   Body mass index is 31.46 kg/m².     APPEARANCE: Well nourished, well developed, in no acute distress.  PSYCH: Appropriate mood and affect.  SKIN: No acne or hirsutism  NECK: Neck symmetric without masses or thyromegaly  NODES: No inguinal, axillary, or supraclavicular lymph node enlargement  CHEST: Normal respiratory effort.  ABDOMEN: Soft.  No tenderness or masses.   BREASTS: Symmetrical, no skin changes or visible lesions.  No palpable masses or nipple discharge bilaterally.  PELVIC: Normal external genitalia without lesions.  Normal hair distribution.  Adequate perineal body, normal urethral meatus.  Vagina moist and well rugated without lesions or discharge.  Cervix pink, without lesions, discharge or tenderness.  No significant cystocele or rectocele.  Bimanual exam shows uterus to be normal size, regular, mobile and nontender.  Adnexa without masses or tenderness.    EXTREMITIES: No edema.    Assessment:     1. Well woman exam  Ambulatory referral/consult to " Obstetrics / Gynecology    Liquid-Based Pap Smear, Screening    HPV High Risk Genotypes, PCR   2. Perimenopause  estradioL 0.75 mg/0.75 gram (0.1%) GlPk    progesterone (PROMETRIUM) 100 MG capsule   3. Encounter for screening mammogram for malignant neoplasm of breast           Plan:     1. Clinical breast exam performed.  2. Pap w HPV.  3. Mammogram scheduled.  4. Symptomatic menopause - trial of HRT.   5. Colonoscopy pending.  Follow up in about 1 month (around 11/1/2020).    Counseling:     Patient was counseled today on current ASCCP pap guidelines, the recommendation for yearly pelvic exams, healthy diet and exercise routines, breast self awareness and annual mammograms.       A full discussion of the benefit-risk ratio of hormonal replacement therapy was carried out. Improvement in vasomotor and other climacteric symptoms were discussed, including possible improvements in sleep and mood. Reduction of risk for osteoporosis was explained. We discussed the study data showing increased risk of thrombo-embolic events such as venous thrombosis and stroke and also possibly breast cancer with estrogen replacement, and how this might affect her. The range of side effects such as breast tenderness, weight gain and including possible increases in lifetime risk of breast cancer and possible thrombotic complications was discussed. We also discussed ACOG's recommendation to use hormone replacement therapy for the relief of hot flashes alone and to be on the lowest dose possible for the shortest amount of time.  Alternative such as herbal and soy-based products were reviewed as well as behavioral modifications and non hormonal prescription medications. All of her questions about this therapy were answered.      She is to see her PCP for other health maintenance.       Use of the [facility] Patient Portal discussed and encouraged during today's visit.       Marina Hawthorne MD

## 2020-10-01 NOTE — LETTER
October 1, 2020      Lynda Bobo MD  [address] [facility]  [address]  [place] [address]           [place] [facility]  [address] [place] [address], [facility] [address]  [facility] [address]  Phone: [phone]          Patient: Yasmine Villa   MR Number: [id number]   YOB: 1965   Date of Visit: 10/1/2020       Dear Dr. Lynda Bobo:    Thank you for referring Yasmine Villa to me for evaluation. Attached you will find relevant portions of my assessment and plan of care.    If you have questions, please do not hesitate to call me. I look forward to following Yasmien Villa along with you.    Sincerely,    Marina Hawthorne MD    Enclosure  CC:  No Recipients    If you would like to receive this communication electronically, please contact [email or web address] or [phone] to request more information on [facility] Link access.    For providers and/or their staff who would like to refer a patient to [facility], please contact us through our one-stop-shop provider referral line, [facility] , at [phone].    If you feel you have received this communication in error or would no longer like to receive these types of communications, please e-mail [email or web address]

## 2020-10-01 NOTE — PATIENT INSTRUCTIONS
Hormone Changes During Menopause  Menopause is not a sudden change. During the months or years before menopause (perimenopause), your ovaries begin to run out of eggs. Your body makes less estrogen and progesterone. This may bring on symptoms such as hot flashes. Youve reached menopause when you have not had a period for 1 year. From that point on, you are in postmenopause.  Perimenopause  In the years leading up to menopause, your ovaries make less estrogen. You release fewer eggs and your periods become less regular.  Symptoms you may have:  · Heavier or lighter periods  · Longer or shorter time between periods  · Hot flashes  · Mood swings  · Night sweats  · Insomnia  · Vaginal dryness  · Urinary changes including incontinence and frequency  Postmenopause  After menopause, you make very little estrogen. As a result, the uterine lining does not thicken and your periods have ended.  Symptoms you may have:  · No periods  · Vaginal dryness  · Hot flashes  · Mood swings  · Night sweats  · Insomnia  Surgical menopause  Menopause can occur after a surgical removal of the uterus (hysterectomy) if the ovaries are also removed. Estrogen and progesterone levels decrease quickly. This may cause sudden and severe symptoms.   Date Last Reviewed: 5/13/2015  © 0900-5300 [email or web address]. [address] [place] [address], [place] [address]. All rights reserved. This information is not intended as a substitute for professional medical care. Always follow your healthcare professional's instructions.

## 2020-10-02 ENCOUNTER — PATIENT MESSAGE (OUTPATIENT)
Dept: OBSTETRICS AND GYNECOLOGY | Facility: CLINIC | Age: 55
End: 2020-10-02

## 2020-10-02 DIAGNOSIS — N95.1 PERIMENOPAUSE: ICD-10-CM

## 2020-10-02 DIAGNOSIS — N95.1 VASOMOTOR SYMPTOMS DUE TO MENOPAUSE: Primary | ICD-10-CM

## 2020-10-02 RX ORDER — ESTRADIOL 0.75 MG/.75G
GEL TOPICAL
Refills: 11 | OUTPATIENT
Start: 2020-10-02

## 2020-10-02 NOTE — TELEPHONE ENCOUNTER
----- Message from Corazon Gil sent at 10/2/2020 11:26 AM CDT -----  Regarding: Refill info  Paddy Nation from [facility] pharmacy can be reached [phone]    Paddy calling about the estradioL 0.75 mg/0.75 gram (0.1%) GlPk  pharmacist will put it in the brand name     Please contact if any questions.    Thank you!

## 2020-10-05 ENCOUNTER — PATIENT MESSAGE (OUTPATIENT)
Dept: INTERNAL MEDICINE | Facility: CLINIC | Age: 55
End: 2020-10-05

## 2020-10-05 ENCOUNTER — PATIENT MESSAGE (OUTPATIENT)
Dept: OBSTETRICS AND GYNECOLOGY | Facility: CLINIC | Age: 55
End: 2020-10-05

## 2020-10-05 RX ORDER — ESTRADIOL 0.04 MG/D
1 FILM, EXTENDED RELEASE TRANSDERMAL
Qty: 8 PATCH | Refills: 11 | Status: SHIPPED | OUTPATIENT
Start: 2020-10-05 | End: 2021-04-01

## 2020-10-09 LAB
HPV HR 12 DNA SPEC QL NAA+PROBE: NEGATIVE
HPV16 AG SPEC QL: NEGATIVE
HPV18 DNA SPEC QL NAA+PROBE: NEGATIVE

## 2020-10-13 ENCOUNTER — TELEPHONE (OUTPATIENT)
Dept: GASTROENTEROLOGY | Facility: CLINIC | Age: 55
End: 2020-10-13

## 2020-10-13 NOTE — TELEPHONE ENCOUNTER
Spoke to patient about scheduling a screening colonoscopy. Patient needs to check her work schedule to see when she can get off. Will call the office back to schedule.

## 2020-10-22 LAB
FINAL PATHOLOGIC DIAGNOSIS: NORMAL
Lab: NORMAL

## 2021-01-26 ENCOUNTER — PATIENT MESSAGE (OUTPATIENT)
Dept: OBSTETRICS AND GYNECOLOGY | Facility: CLINIC | Age: 56
End: 2021-01-26

## 2021-04-01 ENCOUNTER — TELEPHONE (OUTPATIENT)
Dept: GASTROENTEROLOGY | Facility: CLINIC | Age: 56
End: 2021-04-01

## 2021-04-01 ENCOUNTER — OFFICE VISIT (OUTPATIENT)
Dept: FAMILY MEDICINE | Facility: CLINIC | Age: 56
End: 2021-04-01
Payer: COMMERCIAL

## 2021-04-01 VITALS
SYSTOLIC BLOOD PRESSURE: 136 MMHG | WEIGHT: 176.81 LBS | HEART RATE: 108 BPM | OXYGEN SATURATION: 98 % | DIASTOLIC BLOOD PRESSURE: 84 MMHG | BODY MASS INDEX: 31.32 KG/M2 | TEMPERATURE: 98 F

## 2021-04-01 DIAGNOSIS — E66.09 CLASS 1 OBESITY DUE TO EXCESS CALORIES WITHOUT SERIOUS COMORBIDITY WITH BODY MASS INDEX (BMI) OF 31.0 TO 31.9 IN ADULT: ICD-10-CM

## 2021-04-01 DIAGNOSIS — Z12.11 SCREENING FOR MALIGNANT NEOPLASM OF COLON: ICD-10-CM

## 2021-04-01 DIAGNOSIS — I10 ESSENTIAL HYPERTENSION: ICD-10-CM

## 2021-04-01 DIAGNOSIS — F51.01 PRIMARY INSOMNIA: ICD-10-CM

## 2021-04-01 DIAGNOSIS — Z00.00 ANNUAL PHYSICAL EXAM: ICD-10-CM

## 2021-04-01 DIAGNOSIS — F41.9 ANXIETY AND DEPRESSION: ICD-10-CM

## 2021-04-01 DIAGNOSIS — J30.9 ALLERGIC RHINITIS, UNSPECIFIED SEASONALITY, UNSPECIFIED TRIGGER: ICD-10-CM

## 2021-04-01 DIAGNOSIS — G43.009 MIGRAINE WITHOUT AURA AND WITHOUT STATUS MIGRAINOSUS, NOT INTRACTABLE: Primary | ICD-10-CM

## 2021-04-01 DIAGNOSIS — F32.A ANXIETY AND DEPRESSION: ICD-10-CM

## 2021-04-01 DIAGNOSIS — Z01.818 PREOP EXAMINATION: Primary | ICD-10-CM

## 2021-04-01 PROCEDURE — 99999 PR PBB SHADOW E&M-EST. PATIENT-LVL III: ICD-10-PCS | Mod: PBBFAC,,, | Performed by: INTERNAL MEDICINE

## 2021-04-01 PROCEDURE — 99214 OFFICE O/P EST MOD 30 MIN: CPT | Mod: S$GLB,,, | Performed by: INTERNAL MEDICINE

## 2021-04-01 PROCEDURE — 99999 PR PBB SHADOW E&M-EST. PATIENT-LVL III: CPT | Mod: PBBFAC,,, | Performed by: INTERNAL MEDICINE

## 2021-04-01 PROCEDURE — 99214 PR OFFICE/OUTPT VISIT, EST, LEVL IV, 30-39 MIN: ICD-10-PCS | Mod: S$GLB,,, | Performed by: INTERNAL MEDICINE

## 2021-04-01 RX ORDER — SUMATRIPTAN SUCCINATE 100 MG/1
100 TABLET ORAL
Qty: 9 TABLET | Refills: 11 | Status: SHIPPED | OUTPATIENT
Start: 2021-04-01 | End: 2022-06-24 | Stop reason: SDUPTHER

## 2021-04-01 RX ORDER — ESCITALOPRAM OXALATE 5 MG/1
5 TABLET ORAL DAILY
Qty: 90 TABLET | Refills: 3 | Status: SHIPPED | OUTPATIENT
Start: 2021-04-01 | End: 2021-10-05

## 2021-04-01 RX ORDER — SODIUM, POTASSIUM,MAG SULFATES 17.5-3.13G
1 SOLUTION, RECONSTITUTED, ORAL ORAL DAILY
Qty: 1 KIT | Refills: 0 | Status: SHIPPED | OUTPATIENT
Start: 2021-04-01 | End: 2021-04-03

## 2021-05-04 ENCOUNTER — LAB VISIT (OUTPATIENT)
Dept: FAMILY MEDICINE | Facility: CLINIC | Age: 56
End: 2021-05-04
Payer: COMMERCIAL

## 2021-05-04 ENCOUNTER — PATIENT MESSAGE (OUTPATIENT)
Dept: RESEARCH | Facility: HOSPITAL | Age: 56
End: 2021-05-04

## 2021-05-04 DIAGNOSIS — Z01.818 PREOP EXAMINATION: ICD-10-CM

## 2021-05-04 LAB — SARS-COV-2 RNA RESP QL NAA+PROBE: NOT DETECTED

## 2021-05-04 PROCEDURE — U0005 INFEC AGEN DETEC AMPLI PROBE: HCPCS | Performed by: INTERNAL MEDICINE

## 2021-05-04 PROCEDURE — U0003 INFECTIOUS AGENT DETECTION BY NUCLEIC ACID (DNA OR RNA); SEVERE ACUTE RESPIRATORY SYNDROME CORONAVIRUS 2 (SARS-COV-2) (CORONAVIRUS DISEASE [COVID-19]), AMPLIFIED PROBE TECHNIQUE, MAKING USE OF HIGH THROUGHPUT TECHNOLOGIES AS DESCRIBED BY CMS-2020-01-R: HCPCS | Performed by: INTERNAL MEDICINE

## 2021-05-07 ENCOUNTER — PATIENT MESSAGE (OUTPATIENT)
Dept: FAMILY MEDICINE | Facility: CLINIC | Age: 56
End: 2021-05-07

## 2021-05-07 PROBLEM — Z12.11 SCREEN FOR COLON CANCER: Status: ACTIVE | Noted: 2021-05-07

## 2021-05-07 PROBLEM — Z86.010 PERSONAL HISTORY OF COLONIC POLYPS: Status: ACTIVE | Noted: 2021-05-07

## 2021-05-07 PROBLEM — Z86.0100 PERSONAL HISTORY OF COLONIC POLYPS: Status: ACTIVE | Noted: 2021-05-07

## 2021-05-07 PROBLEM — Z12.11 SCREEN FOR COLON CANCER: Status: RESOLVED | Noted: 2021-05-07 | Resolved: 2021-05-07

## 2021-05-14 ENCOUNTER — TELEPHONE (OUTPATIENT)
Dept: GASTROENTEROLOGY | Facility: CLINIC | Age: 56
End: 2021-05-14

## 2021-07-06 ENCOUNTER — PATIENT MESSAGE (OUTPATIENT)
Dept: FAMILY MEDICINE | Facility: CLINIC | Age: 56
End: 2021-07-06

## 2021-09-30 ENCOUNTER — PATIENT MESSAGE (OUTPATIENT)
Dept: FAMILY MEDICINE | Facility: CLINIC | Age: 56
End: 2021-09-30

## 2021-10-05 ENCOUNTER — OFFICE VISIT (OUTPATIENT)
Dept: FAMILY MEDICINE | Facility: CLINIC | Age: 56
End: 2021-10-05
Payer: COMMERCIAL

## 2021-10-05 VITALS
SYSTOLIC BLOOD PRESSURE: 110 MMHG | HEART RATE: 85 BPM | WEIGHT: 173.06 LBS | DIASTOLIC BLOOD PRESSURE: 86 MMHG | HEIGHT: 63 IN | TEMPERATURE: 98 F | OXYGEN SATURATION: 98 % | BODY MASS INDEX: 30.66 KG/M2

## 2021-10-05 DIAGNOSIS — Z00.00 ANNUAL PHYSICAL EXAM: Primary | ICD-10-CM

## 2021-10-05 DIAGNOSIS — E66.09 CLASS 1 OBESITY DUE TO EXCESS CALORIES WITHOUT SERIOUS COMORBIDITY WITH BODY MASS INDEX (BMI) OF 30.0 TO 30.9 IN ADULT: ICD-10-CM

## 2021-10-05 DIAGNOSIS — G43.009 MIGRAINE WITHOUT AURA AND WITHOUT STATUS MIGRAINOSUS, NOT INTRACTABLE: ICD-10-CM

## 2021-10-05 DIAGNOSIS — J30.9 ALLERGIC RHINITIS, UNSPECIFIED SEASONALITY, UNSPECIFIED TRIGGER: ICD-10-CM

## 2021-10-05 DIAGNOSIS — I10 ESSENTIAL HYPERTENSION: ICD-10-CM

## 2021-10-05 DIAGNOSIS — F51.01 PRIMARY INSOMNIA: ICD-10-CM

## 2021-10-05 DIAGNOSIS — Z12.31 ENCOUNTER FOR SCREENING MAMMOGRAM FOR MALIGNANT NEOPLASM OF BREAST: ICD-10-CM

## 2021-10-05 DIAGNOSIS — F32.A ANXIETY AND DEPRESSION: ICD-10-CM

## 2021-10-05 DIAGNOSIS — F41.9 ANXIETY AND DEPRESSION: ICD-10-CM

## 2021-10-05 PROCEDURE — 99396 PR PREVENTIVE VISIT,EST,40-64: ICD-10-PCS | Mod: S$GLB,,, | Performed by: INTERNAL MEDICINE

## 2021-10-05 PROCEDURE — 99396 PREV VISIT EST AGE 40-64: CPT | Mod: S$GLB,,, | Performed by: INTERNAL MEDICINE

## 2021-10-05 PROCEDURE — 99999 PR PBB SHADOW E&M-EST. PATIENT-LVL III: CPT | Mod: PBBFAC,,, | Performed by: INTERNAL MEDICINE

## 2021-10-05 PROCEDURE — 99999 PR PBB SHADOW E&M-EST. PATIENT-LVL III: ICD-10-PCS | Mod: PBBFAC,,, | Performed by: INTERNAL MEDICINE

## 2021-10-05 RX ORDER — TEMAZEPAM 30 MG/1
30 CAPSULE ORAL NIGHTLY
Qty: 30 CAPSULE | Refills: 5 | Status: SHIPPED | OUTPATIENT
Start: 2021-10-05 | End: 2022-04-05 | Stop reason: SDUPTHER

## 2021-12-15 ENCOUNTER — PATIENT OUTREACH (OUTPATIENT)
Dept: ADMINISTRATIVE | Facility: OTHER | Age: 56
End: 2021-12-15
Payer: COMMERCIAL

## 2021-12-16 ENCOUNTER — OFFICE VISIT (OUTPATIENT)
Dept: OBSTETRICS AND GYNECOLOGY | Facility: CLINIC | Age: 56
End: 2021-12-16
Payer: COMMERCIAL

## 2021-12-16 VITALS
DIASTOLIC BLOOD PRESSURE: 82 MMHG | WEIGHT: 172.63 LBS | BODY MASS INDEX: 30.58 KG/M2 | SYSTOLIC BLOOD PRESSURE: 112 MMHG

## 2021-12-16 DIAGNOSIS — Z01.419 WELL WOMAN EXAM WITH ROUTINE GYNECOLOGICAL EXAM: Primary | ICD-10-CM

## 2021-12-16 DIAGNOSIS — Z12.4 SCREENING FOR CERVICAL CANCER: ICD-10-CM

## 2021-12-16 PROCEDURE — 88175 CYTOPATH C/V AUTO FLUID REDO: CPT | Performed by: OBSTETRICS & GYNECOLOGY

## 2021-12-16 PROCEDURE — 99396 PREV VISIT EST AGE 40-64: CPT | Mod: S$GLB,,, | Performed by: OBSTETRICS & GYNECOLOGY

## 2021-12-16 PROCEDURE — 99396 PR PREVENTIVE VISIT,EST,40-64: ICD-10-PCS | Mod: S$GLB,,, | Performed by: OBSTETRICS & GYNECOLOGY

## 2021-12-16 PROCEDURE — 99999 PR PBB SHADOW E&M-EST. PATIENT-LVL III: CPT | Mod: PBBFAC,,, | Performed by: OBSTETRICS & GYNECOLOGY

## 2021-12-16 PROCEDURE — 99999 PR PBB SHADOW E&M-EST. PATIENT-LVL III: ICD-10-PCS | Mod: PBBFAC,,, | Performed by: OBSTETRICS & GYNECOLOGY

## 2021-12-21 LAB
CYTOLOGIST CVX/VAG CYTO: NORMAL
CYTOLOGY CVX/VAG DOC CYTO: NORMAL
CYTOLOGY CVX/VAG DOC THIN PREP: NORMAL
CYTOLOGY THINPREP PAP COMMENT: NORMAL
CYTOLOGY THINPREP PAP COMMENT: NORMAL
PAP NOTE: NORMAL
STAT OF ADQ CVX/VAG CYTO-IMP: NORMAL

## 2022-04-05 ENCOUNTER — OFFICE VISIT (OUTPATIENT)
Dept: INTERNAL MEDICINE | Facility: CLINIC | Age: 57
End: 2022-04-05
Payer: COMMERCIAL

## 2022-04-05 ENCOUNTER — TELEPHONE (OUTPATIENT)
Dept: INTERNAL MEDICINE | Facility: CLINIC | Age: 57
End: 2022-04-05

## 2022-04-05 VITALS
SYSTOLIC BLOOD PRESSURE: 118 MMHG | HEART RATE: 87 BPM | TEMPERATURE: 98 F | OXYGEN SATURATION: 99 % | DIASTOLIC BLOOD PRESSURE: 76 MMHG | WEIGHT: 172.75 LBS | BODY MASS INDEX: 30.6 KG/M2

## 2022-04-05 DIAGNOSIS — F41.9 ANXIETY AND DEPRESSION: ICD-10-CM

## 2022-04-05 DIAGNOSIS — F32.A ANXIETY AND DEPRESSION: ICD-10-CM

## 2022-04-05 DIAGNOSIS — E66.09 CLASS 1 OBESITY DUE TO EXCESS CALORIES WITHOUT SERIOUS COMORBIDITY WITH BODY MASS INDEX (BMI) OF 30.0 TO 30.9 IN ADULT: ICD-10-CM

## 2022-04-05 DIAGNOSIS — I10 ESSENTIAL HYPERTENSION: ICD-10-CM

## 2022-04-05 DIAGNOSIS — F51.01 PRIMARY INSOMNIA: ICD-10-CM

## 2022-04-05 DIAGNOSIS — Z00.00 ANNUAL PHYSICAL EXAM: Primary | ICD-10-CM

## 2022-04-05 DIAGNOSIS — G43.009 MIGRAINE WITHOUT AURA AND WITHOUT STATUS MIGRAINOSUS, NOT INTRACTABLE: ICD-10-CM

## 2022-04-05 PROCEDURE — 99999 PR PBB SHADOW E&M-EST. PATIENT-LVL III: ICD-10-PCS | Mod: PBBFAC,,, | Performed by: INTERNAL MEDICINE

## 2022-04-05 PROCEDURE — 99214 PR OFFICE/OUTPT VISIT, EST, LEVL IV, 30-39 MIN: ICD-10-PCS | Mod: S$GLB,,, | Performed by: INTERNAL MEDICINE

## 2022-04-05 PROCEDURE — 99214 OFFICE O/P EST MOD 30 MIN: CPT | Mod: S$GLB,,, | Performed by: INTERNAL MEDICINE

## 2022-04-05 PROCEDURE — 99999 PR PBB SHADOW E&M-EST. PATIENT-LVL III: CPT | Mod: PBBFAC,,, | Performed by: INTERNAL MEDICINE

## 2022-04-05 RX ORDER — ALPRAZOLAM 0.25 MG/1
0.25 TABLET ORAL DAILY PRN
Qty: 30 TABLET | Refills: 0 | Status: SHIPPED | OUTPATIENT
Start: 2022-04-05 | End: 2022-10-06 | Stop reason: SDUPTHER

## 2022-04-05 RX ORDER — TEMAZEPAM 30 MG/1
30 CAPSULE ORAL NIGHTLY
Qty: 30 CAPSULE | Refills: 5 | Status: SHIPPED | OUTPATIENT
Start: 2022-04-05 | End: 2022-10-06 | Stop reason: SDUPTHER

## 2022-04-05 RX ORDER — SERTRALINE HYDROCHLORIDE 25 MG/1
25 TABLET, FILM COATED ORAL DAILY
Qty: 30 TABLET | Refills: 11 | Status: SHIPPED | OUTPATIENT
Start: 2022-04-05 | End: 2022-04-13 | Stop reason: SDUPTHER

## 2022-04-05 NOTE — ASSESSMENT & PLAN NOTE
Stable on imitrex PRN.  Takes ibuprofen sometimes.  Gets related to period.  Has had more since anxiety has been bad. Has not been having period every month.

## 2022-04-05 NOTE — TELEPHONE ENCOUNTER
----- Message from Joy Agrawal sent at 4/5/2022 10:27 AM CDT -----  Contact: [phone] [facility]  Pharmacy is calling to clarify an RX.  RX name:  ALPRAZolam (XANAX) 0.25 MG tablet  What do they need to clarify:  adding on this medication or cancelling the temazepam (RESTORIL) 30 mg capsule  Comments:

## 2022-04-05 NOTE — ASSESSMENT & PLAN NOTE
Having panic attack every other day, lasts up to 30 minutes to an hour. Lexapro/Cymbalta gave her diarrhea, wellbutrin caused hair loss.  No panic attacks/SI/HI.  Feels like situation with mom is the main  of her anxiety.

## 2022-04-05 NOTE — PROGRESS NOTES
[facility] Note    Chief Complaint      Chief Complaint   Patient presents with    Follow-up     History of Present Illness      Yasmine Villa is a 56 y.o. female who presents today for follow up of HTN.  Patient comes to appointment alone.    Problem List Items Addressed This Visit     Migraine without aura and without status migrainosus, not intractable    Current Assessment & Plan     Stable on imitrex PRN.  Takes ibuprofen sometimes.  Gets related to period.  Has had more since anxiety has been bad. Has not been having period every month.           Anxiety and depression    Current Assessment & Plan     Having panic attack every other day, lasts up to 30 minutes to an hour. Lexapro/Cymbalta gave her diarrhea, wellbutrin caused hair loss.  No panic attacks/SI/HI.  Feels like situation with mom is the main  of her anxiety.           Relevant Medications    sertraline (ZOLOFT) 25 MG tablet    ALPRAZolam (XANAX) 0.25 MG tablet    Class 1 obesity due to excess calories without serious comorbidity with body mass index (BMI) of 30.0 to 30.9 in adult    Current Assessment & Plan     Rides bike [place] everyday, tries to watch portions, eat healthy.           Essential hypertension    Current Assessment & Plan     BP controlled, on no meds.             Other Visit Diagnoses     Annual physical exam    -  Primary    Relevant Orders    CBC Auto Differential    Lipid Panel    Comprehensive Metabolic Panel    TSH    Hemoglobin A1C          Health Maintenance   Topic Date Due    TETANUS VACCINE  Never done    Mammogram  2022    Lipid Panel  10/06/2026    Hepatitis C Screening  Completed       Past Medical History:   Diagnosis Date    Migraines     Personal history of colonic polyps 2021       Past Surgical History:   Procedure Laterality Date    AUGMENTATION OF BREAST  1999    BREAST SURGERY       SECTION      COLONOSCOPY N/A 2021    Procedure: COLONOSCOPY;  Surgeon:  Andie Alonzo MD;  [facility];  Service: Endoscopy;  Laterality: N/A;       family history includes Arthritis in her father and mother; Asthma in her mother; Depression in her mother; Diabetes in her mother; Hearing loss in her father; Hypertension in her father and mother; Menstrual problems in her mother; Stroke in her father.    Social History     Tobacco Use    Smoking status: Never Smoker    Smokeless tobacco: Never Used   Substance Use Topics    Alcohol use: No    Drug use: No       Review of Systems   Constitutional: Negative for chills and fever.   HENT: Negative for sore throat.    Respiratory: Negative for cough and shortness of breath.    Cardiovascular: Negative for chest pain and palpitations.   Gastrointestinal: Negative for constipation, diarrhea, nausea and vomiting.   Genitourinary: Negative for dysuria and hematuria.   Musculoskeletal: Negative for falls.   Neurological: Negative for headaches.        Outpatient Encounter Medications as of 4/5/2022   Medication Sig Dispense Refill    fluticasone propionate (FLONASE) 50 mcg/actuation nasal spray 1 spray (50 mcg total) by Each Nostril route once daily. 16 g 1    progesterone (PROMETRIUM) 100 MG capsule TAKE 1 CAPSULE BY MOUTH NIGHTLY 30 capsule 0    sumatriptan (IMITREX) 100 MG tablet Take 1 tablet (100 mg total) by mouth every 2 (two) hours as needed for Migraine (can take 2nd tablet 2 hours later (not to exceed 2 tablets in 24 hours)). 9 tablet 11    temazepam (RESTORIL) 30 mg capsule Take 1 capsule (30 mg total) by mouth nightly. 30 capsule 5    ALPRAZolam (XANAX) 0.25 MG tablet Take 1 tablet (0.25 mg total) by mouth daily as needed for Anxiety. 30 tablet 0    sertraline (ZOLOFT) 25 MG tablet Take 1 tablet (25 mg total) by mouth once daily. 30 tablet 11     No facility-administered encounter medications on file as of 4/5/2022.        Review of patient's allergies indicates:   Allergen Reactions    Pcn [penicillins]      Wellbutrin [bupropion hcl]      Hair loss       Physical Exam      Vital Signs  Temp: 98.1 °F (36.7 °C)  Pulse: 87  SpO2: 99 %  BP: 118/76  Pain Score: 0-No pain  Height and Weight  Weight: 78.4 kg (172 lb 11.7 oz)]    Physical Exam  Constitutional:       Appearance: She is well-developed.   HENT:      Head: Normocephalic and atraumatic.      Right Ear: External ear normal.      Left Ear: External ear normal.   Eyes:      General:         Right eye: No discharge.         Left eye: No discharge.   Cardiovascular:      Rate and Rhythm: Normal rate and regular rhythm.      Heart sounds: Normal heart sounds. No murmur heard.  Pulmonary:      Effort: Pulmonary effort is normal. No respiratory distress.      Breath sounds: Normal breath sounds.   Abdominal:      General: There is no distension.      Palpations: Abdomen is soft.      Tenderness: There is no abdominal tenderness. There is no guarding.   Musculoskeletal:         General: Normal range of motion.      Cervical back: Normal range of motion.   Skin:     General: Skin is warm and dry.   Neurological:      Mental Status: She is alert and oriented to person, place, and time.   Psychiatric:         Behavior: Behavior normal.          Laboratory:  CBC:  No results for input(s): WBC, RBC, HGB, HCT, PLT, MCV, MCH, MCHC in the last 2160 hours.  CMP:  No results for input(s): GLU, CALCIUM, ALBUMIN, PROT, NA, K, CO2, CL, BUN, ALKPHOS, ALT, AST, BILITOT in the last 2160 hours.    Invalid input(s): CREATININ  URINALYSIS:  No results for input(s): COLORU, CLARITYU, SPECGRAV, PHUR, PROTEINUA, GLUCOSEU, BILIRUBINCON, BLOODU, WBCU, RBCU, BACTERIA, MUCUS, NITRITE, LEUKOCYTESUR, UROBILINOGEN, HYALINECASTS in the last 2160 hours.   LIPIDS:  No results for input(s): TSH, HDL, CHOL, TRIG, LDLCALC, CHOLHDL, NONHDLCHOL, TOTALCHOLEST in the last 2160 hours.  TSH:  No results for input(s): TSH in the last 2160 hours.  A1C:  No results for input(s): HGBA1C in the last 2160  hours.    Radiology:  No results found in the last 30 days.     Assessment/Plan     Yasmine Villa is a 56 y.o.female with:    1. Essential hypertension    2. Anxiety and depression  - sertraline (ZOLOFT) 25 MG tablet; Take 1 tablet (25 mg total) by mouth once daily.  Dispense: 30 tablet; Refill: 11  - ALPRAZolam (XANAX) 0.25 MG tablet; Take 1 tablet (0.25 mg total) by mouth daily as needed for Anxiety.  Dispense: 30 tablet; Refill: 0    3. Migraine without aura and without status migrainosus, not intractable    4. Class 1 obesity due to excess calories without serious comorbidity with body mass index (BMI) of 30.0 to 30.9 in adult    5. Annual physical exam  - CBC Auto Differential; Future  - Lipid Panel; Future  - Comprehensive Metabolic Panel; Future  - TSH; Future  - Hemoglobin A1C; Future      -Continue current medications and maintain follow up with specialists.    -Follow up in about 6 months (around 10/5/2022) for Annual Visit.       Lynda Bobo MD  [facility]

## 2022-04-06 ENCOUNTER — PATIENT MESSAGE (OUTPATIENT)
Dept: INTERNAL MEDICINE | Facility: CLINIC | Age: 57
End: 2022-04-06
Payer: COMMERCIAL

## 2022-04-13 ENCOUNTER — PATIENT MESSAGE (OUTPATIENT)
Dept: INTERNAL MEDICINE | Facility: CLINIC | Age: 57
End: 2022-04-13
Payer: COMMERCIAL

## 2022-04-13 DIAGNOSIS — F32.A ANXIETY AND DEPRESSION: ICD-10-CM

## 2022-04-13 DIAGNOSIS — F41.9 ANXIETY AND DEPRESSION: ICD-10-CM

## 2022-04-13 RX ORDER — SERTRALINE HYDROCHLORIDE 50 MG/1
50 TABLET, FILM COATED ORAL DAILY
Qty: 90 TABLET | Refills: 3 | Status: SHIPPED | OUTPATIENT
Start: 2022-04-13 | End: 2023-04-06

## 2022-06-23 NOTE — TELEPHONE ENCOUNTER
Refill Routing Note   Medication(s) are not appropriate for processing by [facility] for the following reason(s):      - Drug-Disease Interaction (  sumatriptan and Essential hypertension; Class 1 obesity due to excess calories without serious comorbidity with body mass index (BMI) of 30.0 to 30.9 in adult)    [facility] action(s):  Defer Medication-related problems identified: Drug-disease interaction        Medication reconciliation completed: No     Appointments  past 12m or future 3m with PCP    Date Provider   Last Visit   4/5/2022 Lynda Bobo MD   Next Visit   10/6/2022 Lynda Bobo MD   ED visits in past 90 days: 0        Note composed:2:21 PM 06/23/2022

## 2022-06-23 NOTE — TELEPHONE ENCOUNTER
No new care gaps identified.  [facility] Embedded Care Gaps. Reference number: [id number]. 6/23/2022   8:17:28 AM CDT

## 2022-06-24 RX ORDER — SUMATRIPTAN SUCCINATE 100 MG/1
100 TABLET ORAL
Qty: 9 TABLET | Refills: 0 | Status: SHIPPED | OUTPATIENT
Start: 2022-06-24 | End: 2022-10-06 | Stop reason: SDUPTHER

## 2022-06-24 NOTE — TELEPHONE ENCOUNTER
No new care gaps identified.  [facility] Embedded Care Gaps. Reference number: [id number]. 6/24/2022   8:08:00 AM CDT

## 2022-06-27 RX ORDER — SUMATRIPTAN SUCCINATE 100 MG/1
TABLET ORAL
Qty: 9 TABLET | Refills: 3 | OUTPATIENT
Start: 2022-06-27

## 2022-10-06 ENCOUNTER — OFFICE VISIT (OUTPATIENT)
Dept: INTERNAL MEDICINE | Facility: CLINIC | Age: 57
End: 2022-10-06
Payer: COMMERCIAL

## 2022-10-06 VITALS
OXYGEN SATURATION: 96 % | DIASTOLIC BLOOD PRESSURE: 80 MMHG | HEART RATE: 73 BPM | SYSTOLIC BLOOD PRESSURE: 124 MMHG | HEIGHT: 63 IN | WEIGHT: 177 LBS | TEMPERATURE: 99 F | BODY MASS INDEX: 31.36 KG/M2

## 2022-10-06 DIAGNOSIS — Z12.31 ENCOUNTER FOR SCREENING MAMMOGRAM FOR MALIGNANT NEOPLASM OF BREAST: ICD-10-CM

## 2022-10-06 DIAGNOSIS — G43.009 MIGRAINE WITHOUT AURA AND WITHOUT STATUS MIGRAINOSUS, NOT INTRACTABLE: ICD-10-CM

## 2022-10-06 DIAGNOSIS — E66.09 CLASS 1 OBESITY DUE TO EXCESS CALORIES WITHOUT SERIOUS COMORBIDITY WITH BODY MASS INDEX (BMI) OF 31.0 TO 31.9 IN ADULT: ICD-10-CM

## 2022-10-06 DIAGNOSIS — F32.A ANXIETY AND DEPRESSION: ICD-10-CM

## 2022-10-06 DIAGNOSIS — I10 ESSENTIAL HYPERTENSION: ICD-10-CM

## 2022-10-06 DIAGNOSIS — R74.01 ALT (SGPT) LEVEL RAISED: Primary | ICD-10-CM

## 2022-10-06 DIAGNOSIS — F51.01 PRIMARY INSOMNIA: ICD-10-CM

## 2022-10-06 DIAGNOSIS — F41.9 ANXIETY AND DEPRESSION: ICD-10-CM

## 2022-10-06 PROCEDURE — 99999 PR PBB SHADOW E&M-EST. PATIENT-LVL III: CPT | Mod: PBBFAC,,, | Performed by: INTERNAL MEDICINE

## 2022-10-06 PROCEDURE — 99214 OFFICE O/P EST MOD 30 MIN: CPT | Mod: S$GLB,,, | Performed by: INTERNAL MEDICINE

## 2022-10-06 PROCEDURE — 99214 PR OFFICE/OUTPT VISIT, EST, LEVL IV, 30-39 MIN: ICD-10-PCS | Mod: S$GLB,,, | Performed by: INTERNAL MEDICINE

## 2022-10-06 PROCEDURE — 99999 PR PBB SHADOW E&M-EST. PATIENT-LVL III: ICD-10-PCS | Mod: PBBFAC,,, | Performed by: INTERNAL MEDICINE

## 2022-10-06 RX ORDER — TEMAZEPAM 30 MG/1
30 CAPSULE ORAL NIGHTLY
Qty: 30 CAPSULE | Refills: 5 | Status: SHIPPED | OUTPATIENT
Start: 2022-10-06 | End: 2023-04-06 | Stop reason: SDUPTHER

## 2022-10-06 RX ORDER — ALPRAZOLAM 0.25 MG/1
0.25 TABLET ORAL DAILY PRN
Qty: 30 TABLET | Refills: 1 | Status: SHIPPED | OUTPATIENT
Start: 2022-10-06 | End: 2023-04-18 | Stop reason: SDUPTHER

## 2022-10-06 RX ORDER — SUMATRIPTAN SUCCINATE 100 MG/1
100 TABLET ORAL
Qty: 9 TABLET | Refills: 5 | Status: SHIPPED | OUTPATIENT
Start: 2022-10-06 | End: 2023-10-06 | Stop reason: SDUPTHER

## 2022-10-06 NOTE — ASSESSMENT & PLAN NOTE
Started on Zoloft last visit, has been doing better.  Takes 1/2 xanax once per week.   No panic attacks/SI/HI.     From previous visit: Having panic attack every other day, lasts up to 30 minutes to an hour. Lexapro/Cymbalta gave her diarrhea, wellbutrin caused hair loss.  Feels like situation with mom is the main  of her anxiety.

## 2022-10-06 NOTE — PROGRESS NOTES
[facility]    Chief Complaint      Chief Complaint   Patient presents with    Follow-up     History of Present Illness      Yasmine Villa is a 56 y.o. female who presents today for follow up of HTN.  Patient comes to appointment alone.    Problem List Items Addressed This Visit       Migraine without aura and without status migrainosus, not intractable    Current Assessment & Plan     Stable on imitrex PRN.  Takes ibuprofen sometimes.  Gets related to period.  Has had more since anxiety has been bad. Has not been having period every month.         Anxiety and depression    Current Assessment & Plan     Started on Zoloft last visit, has been doing better.  Takes 1/2 xanax once per week.   No panic attacks/SI/HI.     From previous visit: Having panic attack every other day, lasts up to 30 minutes to an hour. Lexapro/Cymbalta gave her diarrhea, wellbutrin caused hair loss.  Feels like situation with mom is the main  of her anxiety.         Relevant Medications    ALPRAZolam (XANAX) 0.25 MG tablet    Insomnia    Current Assessment & Plan     Stable on temazepam PRN with melatonin. Works well for her. Was on Ambien in past but it quit working.  No issues at present.           Relevant Medications    temazepam (RESTORIL) 30 mg capsule    Class 1 obesity due to excess calories without serious comorbidity with body mass index (BMI) of 31.0 to 31.9 in adult    Current Assessment & Plan     Rides bike [place] [employer] everyday, tries to watch portions, eat healthy.         Essential hypertension    Current Assessment & Plan     BP controlled, on no meds.          Other Visit Diagnoses       ALT (SGPT) level raised    -  Primary    Relevant Orders    Comprehensive Metabolic Panel    Encounter for screening mammogram for malignant neoplasm of breast        Relevant Orders    Mammo Digital Screening Bilat w/ Guille            Health Maintenance   Topic Date Due    Mammogram  12/02/2022    TETANUS VACCINE  10/06/2023  (Originally 1982)    Lipid Panel  2027    Hepatitis C Screening  Completed       Past Medical History:   Diagnosis Date    Migraines     Personal history of colonic polyps 2021       Past Surgical History:   Procedure Laterality Date    AUGMENTATION OF BREAST  1999    BREAST SURGERY       SECTION      COLONOSCOPY N/A 2021    Procedure: COLONOSCOPY;  Surgeon: Andie Alonzo MD;  Location: [facility];  Service: Endoscopy;  Laterality: N/A;       family history includes Arthritis in her father and mother; Asthma in her mother; Depression in her mother; Diabetes in her mother; Hearing loss in her father; Hypertension in her father and mother; Menstrual problems in her mother; Stroke in her father.    Social History     Tobacco Use    Smoking status: Never    Smokeless tobacco: Never   Substance Use Topics    Alcohol use: No    Drug use: No       Review of Systems   Constitutional:  Negative for chills and fever.   HENT:  Negative for sore throat.    Respiratory:  Negative for cough and shortness of breath.    Cardiovascular:  Negative for chest pain and palpitations.   Gastrointestinal:  Negative for constipation, diarrhea, nausea and vomiting.   Genitourinary:  Negative for dysuria and hematuria.   Musculoskeletal:  Negative for falls.   Neurological:  Negative for headaches.      Outpatient Encounter Medications as of 10/6/2022   Medication Sig Dispense Refill    fluticasone propionate (FLONASE) 50 mcg/actuation nasal spray 1 spray (50 mcg total) by Each Nostril route once daily. 16 g 1    sertraline (ZOLOFT) 50 MG tablet Take 1 tablet (50 mg total) by mouth once daily. 90 tablet 3    [DISCONTINUED] ALPRAZolam (XANAX) 0.25 MG tablet Take 1 tablet (0.25 mg total) by mouth daily as needed for Anxiety. 30 tablet 0    [DISCONTINUED] sumatriptan (IMITREX) 100 MG tablet Take 1 tablet (100 mg total) by mouth every 2 (two) hours as needed for Migraine (can take 2nd tablet 2 hours later  "(not to exceed 2 tablets in 24 hours)). 9 tablet 0    [DISCONTINUED] temazepam (RESTORIL) 30 mg capsule Take 1 capsule (30 mg total) by mouth nightly. 30 capsule 5    ALPRAZolam (XANAX) 0.25 MG tablet Take 1 tablet (0.25 mg total) by mouth daily as needed for Anxiety. 30 tablet 1    sumatriptan (IMITREX) 100 MG tablet Take 1 tablet (100 mg total) by mouth every 2 (two) hours as needed for Migraine (can take 2nd tablet 2 hours later (not to exceed 2 tablets in 24 hours)). 9 tablet 5    temazepam (RESTORIL) 30 mg capsule Take 1 capsule (30 mg total) by mouth nightly. 30 capsule 5    [DISCONTINUED] progesterone (PROMETRIUM) 100 MG capsule TAKE 1 CAPSULE BY MOUTH NIGHTLY (Patient not taking: Reported on 10/6/2022) 30 capsule 0     No facility-administered encounter medications on file as of 10/6/2022.        Review of patient's allergies indicates:   Allergen Reactions    Pcn [penicillins]     Wellbutrin [bupropion hcl]      Hair loss       Physical Exam      Vital Signs  Temp: 98.5 °F (36.9 °C)  Pulse: 73  SpO2: 96 %  BP: 124/80  Pain Score: 0-No pain  Height and Weight  Height: 5' 3" (160 cm)  Weight: 80.3 kg (177 lb 0.5 oz)  BSA (Calculated - sq m): 1.89 sq meters  BMI (Calculated): 31.4  Weight in (lb) to have BMI = 25: 140.8]    Physical Exam  Constitutional:       Appearance: She is well-developed.   HENT:      Head: Normocephalic and atraumatic.      Right Ear: External ear normal.      Left Ear: External ear normal.   Eyes:      General:         Right eye: No discharge.         Left eye: No discharge.   Cardiovascular:      Rate and Rhythm: Normal rate and regular rhythm.      Heart sounds: Normal heart sounds. No murmur heard.  Pulmonary:      Effort: Pulmonary effort is normal. No respiratory distress.      Breath sounds: Normal breath sounds.   Abdominal:      General: There is no distension.      Palpations: Abdomen is soft.      Tenderness: There is no abdominal tenderness. There is no guarding. "   Musculoskeletal:         General: Normal range of motion.      Cervical back: Normal range of motion.   Skin:     General: Skin is warm and dry.   Neurological:      Mental Status: She is alert and oriented to person, place, and time.   Psychiatric:         Behavior: Behavior normal.        Laboratory:  CBC:  Recent Labs   Lab Result Units 09/29/22  0733   WBC K/uL 4.10   RBC M/uL 5.12   Hemoglobin g/dL 15.2   Hematocrit % 45.8   Platelets K/uL 220   MCV fL 90   MCH pg 29.7   MCHC g/dL 33.2     CMP:  Recent Labs   Lab Result Units 09/29/22  0733   Glucose mg/dL 114*   Calcium mg/dL 9.4   Albumin g/dL 4.4   Total Protein g/dL 7.5   Sodium mmol/L 142   Potassium mmol/L 4.5   CO2 mmol/L 29   Chloride mmol/L 103   BUN mg/dL 15   Alkaline Phosphatase U/L 78   ALT U/L 51*   AST U/L 39   Total Bilirubin mg/dL 0.5     URINALYSIS:  No results for input(s): COLORU, CLARITYU, SPECGRAV, PHUR, PROTEINUA, GLUCOSEU, BILIRUBINCON, BLOODU, WBCU, RBCU, BACTERIA, MUCUS, NITRITE, LEUKOCYTESUR, UROBILINOGEN, HYALINECASTS in the last 2160 hours.   LIPIDS:  Recent Labs   Lab Result Units 09/29/22  0733   TSH uIU/mL 3.550   HDL mg/dL 61   Cholesterol mg/dL 151   Triglycerides mg/dL 108   LDL Cholesterol mg/dL 68.4   HDL/Cholesterol Ratio % 40.4   Non-HDL Cholesterol mg/dL 90   Total Cholesterol/HDL Ratio  2.5     TSH:  Recent Labs   Lab Result Units 09/29/22  0733   TSH uIU/mL 3.550     A1C:  Recent Labs   Lab Result Units 09/29/22  0733   Hemoglobin A1C % 5.2       Radiology:  No results found in the last 30 days.     Assessment/Plan     Yasmine Villa is a 56 y.o.female with:    1. Migraine without aura and without status migrainosus, not intractable    2. Primary insomnia  - temazepam (RESTORIL) 30 mg capsule; Take 1 capsule (30 mg total) by mouth nightly.  Dispense: 30 capsule; Refill: 5    3. Essential hypertension    4. Anxiety and depression  - ALPRAZolam (XANAX) 0.25 MG tablet; Take 1 tablet (0.25 mg total) by mouth daily as needed  for Anxiety.  Dispense: 30 tablet; Refill: 1    5. Class 1 obesity due to excess calories without serious comorbidity with body mass index (BMI) of 31.0 to 31.9 in adult    6. Encounter for screening mammogram for malignant neoplasm of breast  - Mammo Digital Screening Bilat w/ Guille; Future    7. ALT (SGPT) level raised  - Comprehensive Metabolic Panel; Future    -repeat LFT's in 2 weeks  -counseled on increasing exercise  -MMG order  -Continue current medications and maintain follow up with specialists.    -Follow up in about 6 months (around 4/6/2023) for Annual Visit.       Lynda Bobo MD  [facility]

## 2022-10-06 NOTE — ASSESSMENT & PLAN NOTE
Stable on temazepam PRN with melatonin. Works well for her. Was on Ambien in past but it quit working.  No issues at present.

## 2022-12-19 ENCOUNTER — OFFICE VISIT (OUTPATIENT)
Dept: OBSTETRICS AND GYNECOLOGY | Facility: CLINIC | Age: 57
End: 2022-12-19
Payer: COMMERCIAL

## 2022-12-19 VITALS — DIASTOLIC BLOOD PRESSURE: 82 MMHG | SYSTOLIC BLOOD PRESSURE: 140 MMHG | BODY MASS INDEX: 31.5 KG/M2 | WEIGHT: 177.81 LBS

## 2022-12-19 DIAGNOSIS — Z01.419 WELL WOMAN EXAM WITH ROUTINE GYNECOLOGICAL EXAM: Primary | ICD-10-CM

## 2022-12-19 DIAGNOSIS — Z12.4 ROUTINE CERVICAL SMEAR: ICD-10-CM

## 2022-12-19 PROCEDURE — 99999 PR PBB SHADOW E&M-EST. PATIENT-LVL III: CPT | Mod: PBBFAC,,, | Performed by: OBSTETRICS & GYNECOLOGY

## 2022-12-19 PROCEDURE — 88175 CYTOPATH C/V AUTO FLUID REDO: CPT | Performed by: OBSTETRICS & GYNECOLOGY

## 2022-12-19 PROCEDURE — 99396 PR PREVENTIVE VISIT,EST,40-64: ICD-10-PCS | Mod: S$GLB,,, | Performed by: OBSTETRICS & GYNECOLOGY

## 2022-12-19 PROCEDURE — 99396 PREV VISIT EST AGE 40-64: CPT | Mod: S$GLB,,, | Performed by: OBSTETRICS & GYNECOLOGY

## 2022-12-19 PROCEDURE — 99999 PR PBB SHADOW E&M-EST. PATIENT-LVL III: ICD-10-PCS | Mod: PBBFAC,,, | Performed by: OBSTETRICS & GYNECOLOGY

## 2022-12-19 PROCEDURE — 87624 HPV HI-RISK TYP POOLED RSLT: CPT | Performed by: OBSTETRICS & GYNECOLOGY

## 2022-12-19 RX ORDER — ESTRADIOL 10 UG/1
INSERT VAGINAL
Qty: 18 TABLET | Refills: 6 | Status: SHIPPED | OUTPATIENT
Start: 2022-12-19 | End: 2023-04-06

## 2022-12-19 NOTE — PROGRESS NOTES
OBSTETRIC HISTORY:   OB History          1    Para   1    Term   1            AB        Living   1         SAB        IAB        Ectopic        Multiple        Live Births   1           Obstetric Comments   R/  No abnormal pap  No STDs  Menopause 54                GYN History:  Cycle: menarche- 11- reports in the last two years every 4-5 months.  x 1 year. She is not sexually active.  She uses no method for contraception. She does not desire STI screening. She denies vaginal discharge.  Last pap: 10/2021, denies hx of abnormal      HPI:   56 y.o.  Patient's last menstrual period was 10/02/2021 (exact date).    Patient is  here for her annual gynecologic exam.  She has no GYN complaints. Having hot flashes but does not want oral HRT. She denies bladder, bowel and breast complaints.    ROS:  GENERAL: Denies weight gain or weight loss. Feeling well overall.   SKIN: Denies rash or lesions.   HEAD: Denies headache.   CHEST: Denies shortness of breath.   ABDOMEN: No abdominal pain, constipation, diarrhea, nausea, vomiting or rectal bleeding.   URINARY: No frequency, dysuria, hematuria, or burning on urination.  REPRODUCTIVE: See HPI.   BREASTS: The patient denies pain, lumps, or nipple discharge.   HEMATOLOGIC: No easy bruisability.   MUSCULOSKELETAL: Denies joint pain or back pain.   NEUROLOGIC: Denies weakness.   PSYCHIATRIC: Denies depression, anxiety or mood swings.     PE:   BP (!) 140/82   Wt 80.7 kg (177 lb 12.8 oz)   LMP 10/02/2021 (Exact Date)   BMI 31.50 kg/m²   APPEARANCE: Well nourished, well developed, in no acute distress.  NECK: Neck symmetric without  thyromegaly.  NODES: No inguinal, cervical lymph node enlargement.  CHEST: Lungs clear to auscultation.  HEART: Regular rate and rhythm, no murmurs, rubs or gallops.  ABDOMEN: Soft. No tenderness or masses. No hernias.  BREASTS: Symmetrical, no skin changes or visible lesions. No palpable masses, nipple discharge or adenopathy  bilaterally.  PELVIC:   VULVA: No lesions. Normal female genitalia.  URETHRAL MEATUS: Normal size and location, no lesions, no prolapse.  URETHRA: No masses, tenderness, prolapse or scarring.  VAGINA: Atrophic and not well rugated, no discharge, no significant cystocele or rectocele.  CERVIX: No lesions and discharge.   UTERUS: Normal size, regular shape, mobile, non-tender, bladder base nontender.  ADNEXA: No masses or tenderness.    PROCEDURES:  Pap smear  HRHPV    Assessment:  Normal Gynecologic Exam  HRT--does not want oral but discussed recent study on vaginal estrogen was shown to help with osteoporosis but did not increase the morbidity or mortality in patients from any cause.       Plan:  Mammogram and Colonoscopy if indicated by current recommendations.  Return to clinic in one year or for any problems or complaints.    Counseling:  Patient was counseled today on A.C.S. Pap guidelines and recommendations for yearly pelvic exams and monthly self breast exams; to see her PCP for other health maintenance. Regular exercise and healthy diet.     As of April 1, 2021, the Cures Act has been passed nationally. This new law requires that all doctors progress notes, lab results, pathology reports and radiology reports be released IMMEDIATELY to the patient in the patient portal. That means that the results are released to you at the EXACT same time they are released to me. Therefore, with all of the patients that I have I am not able to reply to each patient exactly when the results come in. So there will be a delay from when you see the results to when I see them and have time to come up with a response to send you. Also I only see these results when I am on the computer at work. So if the results come in over the weekend or after 5 pm of a work day, I will not see them until the next business day. As you can tell, this is a challenge as a physician to give every patient the quick response they hope for and deserve.  So please be patient!     Thanks for understanding,

## 2022-12-23 LAB
FINAL PATHOLOGIC DIAGNOSIS: NORMAL
Lab: NORMAL

## 2022-12-28 ENCOUNTER — PATIENT MESSAGE (OUTPATIENT)
Dept: OBSTETRICS AND GYNECOLOGY | Facility: CLINIC | Age: 57
End: 2022-12-28
Payer: COMMERCIAL

## 2022-12-28 LAB
HPV HR 12 DNA SPEC QL NAA+PROBE: NEGATIVE
HPV16 AG SPEC QL: POSITIVE
HPV18 DNA SPEC QL NAA+PROBE: NEGATIVE

## 2022-12-29 ENCOUNTER — PATIENT MESSAGE (OUTPATIENT)
Dept: OBSTETRICS AND GYNECOLOGY | Facility: CLINIC | Age: 57
End: 2022-12-29
Payer: COMMERCIAL

## 2023-02-28 ENCOUNTER — TELEPHONE (OUTPATIENT)
Dept: OBSTETRICS AND GYNECOLOGY | Facility: CLINIC | Age: 58
End: 2023-02-28
Payer: COMMERCIAL

## 2023-02-28 NOTE — TELEPHONE ENCOUNTER
----- Message from Jennifer Mota sent at 2/28/2023  8:35 AM CST -----  Regarding: reschedule  Contact: [phone]  Patient is requesting a call back regarding rescheduling her procedure.  Would the patient rather a call back or a response via [facility]?  Call   Best Call Back Number:  [phone]  Additional Information:

## 2023-04-06 ENCOUNTER — OFFICE VISIT (OUTPATIENT)
Dept: PRIMARY CARE CLINIC | Facility: CLINIC | Age: 58
End: 2023-04-06
Payer: COMMERCIAL

## 2023-04-06 DIAGNOSIS — F41.9 ANXIETY AND DEPRESSION: ICD-10-CM

## 2023-04-06 DIAGNOSIS — F32.A ANXIETY AND DEPRESSION: ICD-10-CM

## 2023-04-06 DIAGNOSIS — Z00.00 ANNUAL PHYSICAL EXAM: Primary | ICD-10-CM

## 2023-04-06 DIAGNOSIS — I10 ESSENTIAL HYPERTENSION: ICD-10-CM

## 2023-04-06 DIAGNOSIS — E66.09 CLASS 1 OBESITY DUE TO EXCESS CALORIES WITHOUT SERIOUS COMORBIDITY WITH BODY MASS INDEX (BMI) OF 31.0 TO 31.9 IN ADULT: ICD-10-CM

## 2023-04-06 DIAGNOSIS — F51.01 PRIMARY INSOMNIA: ICD-10-CM

## 2023-04-06 DIAGNOSIS — G43.009 MIGRAINE WITHOUT AURA AND WITHOUT STATUS MIGRAINOSUS, NOT INTRACTABLE: ICD-10-CM

## 2023-04-06 DIAGNOSIS — J30.9 ALLERGIC RHINITIS, UNSPECIFIED SEASONALITY, UNSPECIFIED TRIGGER: ICD-10-CM

## 2023-04-06 PROCEDURE — 99999 PR PBB SHADOW E&M-EST. PATIENT-LVL III: ICD-10-PCS | Mod: PBBFAC,,, | Performed by: INTERNAL MEDICINE

## 2023-04-06 PROCEDURE — 99214 PR OFFICE/OUTPT VISIT, EST, LEVL IV, 30-39 MIN: ICD-10-PCS | Mod: S$GLB,,, | Performed by: INTERNAL MEDICINE

## 2023-04-06 PROCEDURE — 99214 OFFICE O/P EST MOD 30 MIN: CPT | Mod: S$GLB,,, | Performed by: INTERNAL MEDICINE

## 2023-04-06 PROCEDURE — 99999 PR PBB SHADOW E&M-EST. PATIENT-LVL III: CPT | Mod: PBBFAC,,, | Performed by: INTERNAL MEDICINE

## 2023-04-06 RX ORDER — TEMAZEPAM 30 MG/1
30 CAPSULE ORAL NIGHTLY
Qty: 30 CAPSULE | Refills: 5 | Status: SHIPPED | OUTPATIENT
Start: 2023-04-06 | End: 2023-10-06 | Stop reason: SDUPTHER

## 2023-04-06 NOTE — PROGRESS NOTES
[facility]    Chief Complaint      Chief Complaint   Patient presents with    Follow-up     History of Present Illness      Yasmine Villa is a 57 y.o. female who presents today for follow up of migraine.  Patient comes to appointment alone.    Problem List Items Addressed This Visit       Migraine without aura and without status migrainosus, not intractable    Current Assessment & Plan     Stable on imitrex PRN, takes 3 per month.  Takes ibuprofen sometimes.  Usually related to work stress.           Anxiety and depression    Current Assessment & Plan     Stopped Zoloft because she felt like it was making her apathetic.  Takes 1/2 xanax 1-2 times per week.   No panic attacks/SI/HI.     From previous visit: Having panic attack every other day, lasts up to 30 minutes to an hour. Lexapro/Cymbalta gave her diarrhea, wellbutrin caused hair loss.  Feels like situation with mom is the main  of her anxiety.           Insomnia    Current Assessment & Plan     Stable on temazepam PRN with melatonin. Works well for her. Was on Ambien in past but it quit working.  No issues at present.             Relevant Medications    temazepam (RESTORIL) 30 mg capsule    Allergic rhinitis    Current Assessment & Plan     Stable on zyrtec and flonase, works pretty well for her.           Class 1 obesity due to excess calories without serious comorbidity with body mass index (BMI) of 31.0 to 31.9 in adult    Current Assessment & Plan     Has not been exercising, trying to work on diet.  Doesn't like fast food.  Tries to cook.           Essential hypertension    Current Assessment & Plan     BP controlled, on no meds.            Other Visit Diagnoses       Annual physical exam    -  Primary    Relevant Orders    Hemoglobin A1C    TSH    CBC Auto Differential    Lipid Panel    Comprehensive Metabolic Panel            Health Maintenance   Topic Date Due    TETANUS VACCINE  10/06/2023 (Originally 12/20/1983)    Mammogram   2023    Lipid Panel  2027    Hepatitis C Screening  Completed       Past Medical History:   Diagnosis Date    Migraines     Personal history of colonic polyps 2021       Past Surgical History:   Procedure Laterality Date    AUGMENTATION OF BREAST  1999    BREAST SURGERY       SECTION      COLONOSCOPY N/A 2021    Procedure: COLONOSCOPY;  Surgeon: Andie Alonzo MD;  Location: [facility];  Service: Endoscopy;  Laterality: N/A;       family history includes Arthritis in her father and mother; Asthma in her mother; Depression in her mother; Diabetes in her mother; Hearing loss in her father; Hypertension in her father and mother; Menstrual problems in her mother; Stroke in her father.    Social History     Tobacco Use    Smoking status: Never    Smokeless tobacco: Never   Substance Use Topics    Alcohol use: No    Drug use: No       Review of Systems   Constitutional:  Negative for chills and fever.   HENT:  Negative for sore throat.    Respiratory:  Negative for cough and shortness of breath.    Cardiovascular:  Negative for chest pain and palpitations.   Gastrointestinal:  Negative for constipation, diarrhea, nausea and vomiting.   Genitourinary:  Negative for dysuria and hematuria.   Musculoskeletal:  Negative for falls.   Neurological:  Negative for headaches.      Outpatient Encounter Medications as of 2023   Medication Sig Dispense Refill    ALPRAZolam (XANAX) 0.25 MG tablet Take 1 tablet (0.25 mg total) by mouth daily as needed for Anxiety. 30 tablet 1    fluticasone propionate (FLONASE) 50 mcg/actuation nasal spray 1 spray (50 mcg total) by Each Nostril route once daily. 16 g 1    sumatriptan (IMITREX) 100 MG tablet Take 1 tablet (100 mg total) by mouth every 2 (two) hours as needed for Migraine (can take 2nd tablet 2 hours later (not to exceed 2 tablets in 24 hours)). 9 tablet 5    [DISCONTINUED] temazepam (RESTORIL) 30 mg capsule Take 1 capsule (30 mg total) by mouth  "nightly. 30 capsule 5    temazepam (RESTORIL) 30 mg capsule Take 1 capsule (30 mg total) by mouth nightly. 30 capsule 5    [DISCONTINUED] estradioL (VAGIFEM) 10 mcg Tab One tablet per vagina hs x 2 weeks then 3 times per week (Patient not taking: Reported on 4/6/2023) 18 tablet 6    [DISCONTINUED] sertraline (ZOLOFT) 50 MG tablet Take 1 tablet (50 mg total) by mouth once daily. (Patient not taking: Reported on 4/6/2023) 90 tablet 3     No facility-administered encounter medications on file as of 4/6/2023.        Review of patient's allergies indicates:   Allergen Reactions    Pcn [penicillins]     Wellbutrin [bupropion hcl]      Hair loss       Physical Exam      Vital Signs  Pulse: (P) 86  SpO2: (P) 98 %  BP: (P) 130/82  Pain Score: (P) 0-No pain  Height and Weight  Height: (P) 5' 3" (160 cm)  Weight: (P) 81.1 kg (178 lb 12.7 oz)  BSA (Calculated - sq m): (P) 1.9 sq meters  BMI (Calculated): (P) 31.7  Weight in (lb) to have BMI = 25: (P) 140.8]    Physical Exam  Constitutional:       Appearance: She is well-developed.   HENT:      Head: Normocephalic and atraumatic.      Right Ear: External ear normal.      Left Ear: External ear normal.   Eyes:      General:         Right eye: No discharge.         Left eye: No discharge.   Cardiovascular:      Rate and Rhythm: Normal rate and regular rhythm.      Heart sounds: Normal heart sounds. No murmur heard.  Pulmonary:      Effort: Pulmonary effort is normal. No respiratory distress.      Breath sounds: Normal breath sounds.   Abdominal:      General: There is no distension.      Palpations: Abdomen is soft.      Tenderness: There is no abdominal tenderness. There is no guarding.   Musculoskeletal:         General: Normal range of motion.      Cervical back: Normal range of motion.   Skin:     General: Skin is warm and dry.   Neurological:      Mental Status: She is alert and oriented to person, place, and time.   Psychiatric:         Behavior: Behavior normal.    "     Laboratory:  CBC:  No results for input(s): WBC, RBC, HGB, HCT, PLT, MCV, MCH, MCHC in the last 2160 hours.    CMP:  No results for input(s): GLU, CALCIUM, ALBUMIN, PROT, NA, K, CO2, CL, BUN, ALKPHOS, ALT, AST, BILITOT in the last 2160 hours.    Invalid input(s): CREATININ    URINALYSIS:  No results for input(s): COLORU, CLARITYU, SPECGRAV, PHUR, PROTEINUA, GLUCOSEU, BILIRUBINCON, BLOODU, WBCU, RBCU, BACTERIA, MUCUS, NITRITE, LEUKOCYTESUR, UROBILINOGEN, HYALINECASTS in the last 2160 hours.   LIPIDS:  No results for input(s): TSH, HDL, CHOL, TRIG, LDLCALC, CHOLHDL, NONHDLCHOL, TOTALCHOLEST in the last 2160 hours.    TSH:  No results for input(s): TSH in the last 2160 hours.    A1C:  No results for input(s): HGBA1C in the last 2160 hours.      Radiology:  No results found in the last 30 days.     Assessment/Plan     Yasmine Villa is a 57 y.o.female with:    1. Migraine without aura and without status migrainosus, not intractable    2. Anxiety and depression    3. Essential hypertension    4. Class 1 obesity due to excess calories without serious comorbidity with body mass index (BMI) of 31.0 to 31.9 in adult    5. Primary insomnia  - temazepam (RESTORIL) 30 mg capsule; Take 1 capsule (30 mg total) by mouth nightly.  Dispense: 30 capsule; Refill: 5    6. Allergic rhinitis, unspecified seasonality, unspecified trigger    7. Annual physical exam  - Hemoglobin A1C; Future  - TSH; Future  - CBC Auto Differential; Future  - Lipid Panel; Future  - Comprehensive Metabolic Panel; Future      -labs next visit  -Continue current medications and maintain follow up with specialists.    -No follow-ups on file.       Lynda Bobo MD  [facility] [place]

## 2023-04-06 NOTE — ASSESSMENT & PLAN NOTE
Stopped Zoloft because she felt like it was making her apathetic.  Takes 1/2 xanax 1-2 times per week.   No panic attacks/SI/HI.     From previous visit: Having panic attack every other day, lasts up to 30 minutes to an hour. Lexapro/Cymbalta gave her diarrhea, wellbutrin caused hair loss.  Feels like situation with mom is the main  of her anxiety.

## 2023-04-06 NOTE — ASSESSMENT & PLAN NOTE
Stable on imitrex PRN, takes 3 per month.  Takes ibuprofen sometimes.  Usually related to work stress.

## 2023-04-19 ENCOUNTER — PROCEDURE VISIT (OUTPATIENT)
Dept: OBSTETRICS AND GYNECOLOGY | Facility: CLINIC | Age: 58
End: 2023-04-19
Payer: COMMERCIAL

## 2023-04-19 VITALS — DIASTOLIC BLOOD PRESSURE: 84 MMHG | SYSTOLIC BLOOD PRESSURE: 118 MMHG | BODY MASS INDEX: 31.5 KG/M2 | WEIGHT: 177.81 LBS

## 2023-04-19 DIAGNOSIS — R87.810 CERVICAL HIGH RISK HPV (HUMAN PAPILLOMAVIRUS) TEST POSITIVE: Primary | ICD-10-CM

## 2023-04-19 PROCEDURE — 57454 COLPOSCOPY: ICD-10-PCS | Mod: S$GLB,,, | Performed by: OBSTETRICS & GYNECOLOGY

## 2023-04-19 PROCEDURE — 88305 TISSUE EXAM BY PATHOLOGIST: CPT | Mod: 59 | Performed by: PATHOLOGY

## 2023-04-19 PROCEDURE — 88305 TISSUE EXAM BY PATHOLOGIST: ICD-10-PCS | Mod: 26,,, | Performed by: PATHOLOGY

## 2023-04-19 PROCEDURE — 57454 BX/CURETT OF CERVIX W/SCOPE: CPT | Mod: S$GLB,,, | Performed by: OBSTETRICS & GYNECOLOGY

## 2023-04-19 PROCEDURE — 88342 IMHCHEM/IMCYTCHM 1ST ANTB: CPT | Mod: 59 | Performed by: PATHOLOGY

## 2023-04-19 PROCEDURE — 99499 NO LOS: ICD-10-PCS | Mod: S$GLB,,, | Performed by: OBSTETRICS & GYNECOLOGY

## 2023-04-19 PROCEDURE — 99499 UNLISTED E&M SERVICE: CPT | Mod: S$GLB,,, | Performed by: OBSTETRICS & GYNECOLOGY

## 2023-04-19 PROCEDURE — 88305 TISSUE EXAM BY PATHOLOGIST: CPT | Mod: 26,,, | Performed by: PATHOLOGY

## 2023-04-19 PROCEDURE — 88342 CHG IMMUNOCYTOCHEMISTRY: ICD-10-PCS | Mod: 26,,, | Performed by: PATHOLOGY

## 2023-04-19 PROCEDURE — 88342 IMHCHEM/IMCYTCHM 1ST ANTB: CPT | Mod: 26,,, | Performed by: PATHOLOGY

## 2023-04-19 NOTE — PROCEDURES
Colposcopy    Date/Time: 4/19/2023 1:00 PM  Performed by: Jacquelyn Lovell MD  Authorized by: Jacquelyn Lovell MD     Consent Done?:  Yes (Written)  Timeout:Immediately prior to procedure a time out was called to verify the correct patient, procedure, equipment, support staff and site/side marked as required  Assistants?: Yes    List of assistants:  Rayna Bean MA   I was present for the entire procedure.    Colposcopy Site:  Cervix  Acrowhite Lesion? Yes    Atypical Vessels: No    Transformation Zone Adequate?: Yes    Biopsy?: Yes         Location:  Cervix ((1 00))  ECC Performed?: Yes    LEEP Performed?: No     Patient tolerated the procedure well with no immediate complications.

## 2023-04-20 ENCOUNTER — TELEPHONE (OUTPATIENT)
Dept: PRIMARY CARE CLINIC | Facility: CLINIC | Age: 58
End: 2023-04-20
Payer: COMMERCIAL

## 2023-04-20 NOTE — TELEPHONE ENCOUNTER
----- Message from Cande German sent at 4/20/2023 12:12 PM CDT -----  Regarding: [place]  Type: Patient Call Back    Who called:  [id number] [place] - [id number]  [id number]  [place] [id number]  Phone: [phone] Fax: [phone]    What is the request in detail: need to verify drug interaction for ALPRAZolam (XANAX) 0.25 MG tablet Tamazapam

## 2023-04-27 ENCOUNTER — PATIENT MESSAGE (OUTPATIENT)
Dept: OBSTETRICS AND GYNECOLOGY | Facility: CLINIC | Age: 58
End: 2023-04-27
Payer: COMMERCIAL

## 2023-04-27 LAB
FINAL PATHOLOGIC DIAGNOSIS: NORMAL
GROSS: NORMAL
Lab: NORMAL

## 2023-10-06 ENCOUNTER — OFFICE VISIT (OUTPATIENT)
Dept: PRIMARY CARE CLINIC | Facility: CLINIC | Age: 58
End: 2023-10-06
Payer: COMMERCIAL

## 2023-10-06 VITALS
SYSTOLIC BLOOD PRESSURE: 136 MMHG | HEART RATE: 103 BPM | DIASTOLIC BLOOD PRESSURE: 84 MMHG | HEIGHT: 63 IN | WEIGHT: 182.31 LBS | OXYGEN SATURATION: 97 % | BODY MASS INDEX: 32.3 KG/M2

## 2023-10-06 DIAGNOSIS — F51.01 PRIMARY INSOMNIA: ICD-10-CM

## 2023-10-06 DIAGNOSIS — I10 ESSENTIAL HYPERTENSION: ICD-10-CM

## 2023-10-06 DIAGNOSIS — Z00.00 ANNUAL PHYSICAL EXAM: Primary | ICD-10-CM

## 2023-10-06 DIAGNOSIS — F41.9 ANXIETY AND DEPRESSION: ICD-10-CM

## 2023-10-06 DIAGNOSIS — F32.A ANXIETY AND DEPRESSION: ICD-10-CM

## 2023-10-06 DIAGNOSIS — Z12.31 ENCOUNTER FOR SCREENING MAMMOGRAM FOR MALIGNANT NEOPLASM OF BREAST: ICD-10-CM

## 2023-10-06 DIAGNOSIS — E66.09 CLASS 1 OBESITY DUE TO EXCESS CALORIES WITHOUT SERIOUS COMORBIDITY WITH BODY MASS INDEX (BMI) OF 32.0 TO 32.9 IN ADULT: ICD-10-CM

## 2023-10-06 DIAGNOSIS — G43.009 MIGRAINE WITHOUT AURA AND WITHOUT STATUS MIGRAINOSUS, NOT INTRACTABLE: ICD-10-CM

## 2023-10-06 DIAGNOSIS — M77.8 RIGHT ELBOW TENDINITIS: ICD-10-CM

## 2023-10-06 PROCEDURE — 1160F RVW MEDS BY RX/DR IN RCRD: CPT | Mod: CPTII,S$GLB,, | Performed by: INTERNAL MEDICINE

## 2023-10-06 PROCEDURE — 99999 PR PBB SHADOW E&M-EST. PATIENT-LVL III: ICD-10-PCS | Mod: PBBFAC,,, | Performed by: INTERNAL MEDICINE

## 2023-10-06 PROCEDURE — 1160F PR REVIEW ALL MEDS BY PRESCRIBER/CLIN PHARMACIST DOCUMENTED: ICD-10-PCS | Mod: CPTII,S$GLB,, | Performed by: INTERNAL MEDICINE

## 2023-10-06 PROCEDURE — 3079F PR MOST RECENT DIASTOLIC BLOOD PRESSURE 80-89 MM HG: ICD-10-PCS | Mod: CPTII,S$GLB,, | Performed by: INTERNAL MEDICINE

## 2023-10-06 PROCEDURE — 3044F HG A1C LEVEL LT 7.0%: CPT | Mod: CPTII,S$GLB,, | Performed by: INTERNAL MEDICINE

## 2023-10-06 PROCEDURE — 3008F PR BODY MASS INDEX (BMI) DOCUMENTED: ICD-10-PCS | Mod: CPTII,S$GLB,, | Performed by: INTERNAL MEDICINE

## 2023-10-06 PROCEDURE — 1159F PR MEDICATION LIST DOCUMENTED IN MEDICAL RECORD: ICD-10-PCS | Mod: CPTII,S$GLB,, | Performed by: INTERNAL MEDICINE

## 2023-10-06 PROCEDURE — 3044F PR MOST RECENT HEMOGLOBIN A1C LEVEL <7.0%: ICD-10-PCS | Mod: CPTII,S$GLB,, | Performed by: INTERNAL MEDICINE

## 2023-10-06 PROCEDURE — 3008F BODY MASS INDEX DOCD: CPT | Mod: CPTII,S$GLB,, | Performed by: INTERNAL MEDICINE

## 2023-10-06 PROCEDURE — 3075F PR MOST RECENT SYSTOLIC BLOOD PRESS GE 130-139MM HG: ICD-10-PCS | Mod: CPTII,S$GLB,, | Performed by: INTERNAL MEDICINE

## 2023-10-06 PROCEDURE — 99396 PR PREVENTIVE VISIT,EST,40-64: ICD-10-PCS | Mod: S$GLB,,, | Performed by: INTERNAL MEDICINE

## 2023-10-06 PROCEDURE — 99396 PREV VISIT EST AGE 40-64: CPT | Mod: S$GLB,,, | Performed by: INTERNAL MEDICINE

## 2023-10-06 PROCEDURE — 1159F MED LIST DOCD IN RCRD: CPT | Mod: CPTII,S$GLB,, | Performed by: INTERNAL MEDICINE

## 2023-10-06 PROCEDURE — 3075F SYST BP GE 130 - 139MM HG: CPT | Mod: CPTII,S$GLB,, | Performed by: INTERNAL MEDICINE

## 2023-10-06 PROCEDURE — 3079F DIAST BP 80-89 MM HG: CPT | Mod: CPTII,S$GLB,, | Performed by: INTERNAL MEDICINE

## 2023-10-06 PROCEDURE — 99999 PR PBB SHADOW E&M-EST. PATIENT-LVL III: CPT | Mod: PBBFAC,,, | Performed by: INTERNAL MEDICINE

## 2023-10-06 RX ORDER — ALPRAZOLAM 0.25 MG/1
0.25 TABLET ORAL DAILY PRN
Qty: 30 TABLET | Refills: 1 | Status: SHIPPED | OUTPATIENT
Start: 2023-10-06 | End: 2023-12-05

## 2023-10-06 RX ORDER — TEMAZEPAM 30 MG/1
30 CAPSULE ORAL NIGHTLY
Qty: 30 CAPSULE | Refills: 5 | Status: SHIPPED | OUTPATIENT
Start: 2023-10-06

## 2023-10-06 RX ORDER — SUMATRIPTAN SUCCINATE 100 MG/1
100 TABLET ORAL
Qty: 9 TABLET | Refills: 5 | Status: SHIPPED | OUTPATIENT
Start: 2023-10-06

## 2023-10-06 NOTE — PROGRESS NOTES
[facility]    Chief Complaint      Chief Complaint   Patient presents with    Annual Exam     History of Present Illness      Yasmine Villa is a 57 y.o. female who presents today for Annual preventative visit.  Patient comes to appointment alone.    C/O right elbow pain, lateral upper elbow, shoots down into middle finger. Better with compression sleeve.  Taking NSAIDS for this.    Problem List Items Addressed This Visit       Migraine without aura and without status migrainosus, not intractable    Current Assessment & Plan     Stable on imitrex PRN, takes 3 per month.  Takes ibuprofen sometimes.           Relevant Medications    sumatriptan (IMITREX) 100 MG tablet    Anxiety and depression    Current Assessment & Plan     Stable. Takes 1/2 xanax 1-2 times per week.   No panic attacks/SI/HI.  Lost last job but has a less stressful job now. Dad had stroke.    From previous visits: Zoloft made her apathetic. Lexapro/Cymbalta gave her diarrhea, wellbutrin caused hair loss.  Feels like situation with mom is the main  of her anxiety.         Relevant Medications    ALPRAZolam (XANAX) 0.25 MG tablet    Insomnia    Current Assessment & Plan     Stable on temazepam PRN with melatonin. Works well for her. Was on Ambien in past but it quit working.  No issues at present.           Relevant Medications    temazepam (RESTORIL) 30 mg capsule    Class 1 obesity due to excess calories without serious comorbidity with body mass index (BMI) of 32.0 to 32.9 in adult    Current Assessment & Plan     Has not been exercising, trying to work on diet.  Doesn't like fast food.  Tries to cook.         Essential hypertension    Current Assessment & Plan     BP controlled, on no meds.          Other Visit Diagnoses       Annual physical exam    -  Primary    Right elbow tendinitis        Encounter for screening mammogram for malignant neoplasm of breast        Relevant Orders    Mammo Digital Screening Bilat w/ Guille               Health Maintenance   Topic Date Due    Mammogram  2023    TETANUS VACCINE  10/06/2023 (Originally 1982)    Shingles Vaccine (1 of 2) 10/06/2023 (Originally 2014)    Colorectal Cancer Screening  2024    Lipid Panel  2028    Hepatitis C Screening  Completed       Past Medical History:   Diagnosis Date    Migraines     Personal history of colonic polyps 2021       Past Surgical History:   Procedure Laterality Date    AUGMENTATION OF BREAST  1999    BREAST SURGERY       SECTION      COLONOSCOPY N/A 2021    Procedure: COLONOSCOPY;  Surgeon: Andie Alonzo MD;  Location: [facility];  Service: Endoscopy;  Laterality: N/A;       family history includes Arthritis in her father and mother; Asthma in her mother; Depression in her mother; Diabetes in her mother; Hearing loss in her father; Hypertension in her father and mother; Menstrual problems in her mother; Stroke in her father.    Social History     Tobacco Use    Smoking status: Never    Smokeless tobacco: Never   Substance Use Topics    Alcohol use: No    Drug use: No       Review of Systems   Constitutional:  Negative for chills and fever.   HENT:  Negative for sore throat.    Respiratory:  Negative for cough and shortness of breath.    Cardiovascular:  Negative for chest pain and palpitations.   Gastrointestinal:  Negative for constipation, diarrhea, nausea and vomiting.   Genitourinary:  Negative for dysuria and hematuria.   Musculoskeletal:  Negative for falls.   Neurological:  Negative for headaches.        Outpatient Encounter Medications as of 10/6/2023   Medication Sig Dispense Refill    fluticasone propionate (FLONASE) 50 mcg/actuation nasal spray 1 spray (50 mcg total) by Each Nostril route once daily. 16 g 1    [DISCONTINUED] ALPRAZolam (XANAX) 0.25 MG tablet Take 1 tablet (0.25 mg total) by mouth daily as needed for Anxiety. 30 tablet 1    [DISCONTINUED] sumatriptan (IMITREX) 100 MG tablet  "Take 1 tablet (100 mg total) by mouth every 2 (two) hours as needed for Migraine (can take 2nd tablet 2 hours later (not to exceed 2 tablets in 24 hours)). 9 tablet 5    [DISCONTINUED] temazepam (RESTORIL) 30 mg capsule Take 1 capsule (30 mg total) by mouth nightly. 30 capsule 5    ALPRAZolam (XANAX) 0.25 MG tablet Take 1 tablet (0.25 mg total) by mouth daily as needed for Anxiety. 30 tablet 1    sumatriptan (IMITREX) 100 MG tablet Take 1 tablet (100 mg total) by mouth every 2 (two) hours as needed for Migraine (can take 2nd tablet 2 hours later (not to exceed 2 tablets in 24 hours)). 9 tablet 5    temazepam (RESTORIL) 30 mg capsule Take 1 capsule (30 mg total) by mouth nightly. 30 capsule 5     No facility-administered encounter medications on file as of 10/6/2023.        Review of patient's allergies indicates:   Allergen Reactions    Pcn [penicillins]     Wellbutrin [bupropion hcl]      Hair loss       Physical Exam      Vital Signs  Pulse: 103  SpO2: 97 %  BP: 136/84  Pain Score:   4  Pain Loc: Elbow  Height and Weight  Height: 5' 3" (160 cm)  Weight: 82.7 kg (182 lb 5.1 oz)  BSA (Calculated - sq m): 1.92 sq meters  BMI (Calculated): 32.3  Weight in (lb) to have BMI = 25: 140.8]    Physical Exam  Constitutional:       Appearance: She is well-developed.   HENT:      Head: Normocephalic and atraumatic.      Right Ear: External ear normal.      Left Ear: External ear normal.   Eyes:      General:         Right eye: No discharge.         Left eye: No discharge.   Cardiovascular:      Rate and Rhythm: Normal rate and regular rhythm.      Heart sounds: Normal heart sounds. No murmur heard.  Pulmonary:      Effort: Pulmonary effort is normal. No respiratory distress.      Breath sounds: Normal breath sounds.   Abdominal:      General: There is no distension.      Palpations: Abdomen is soft.      Tenderness: There is no abdominal tenderness. There is no guarding.   Musculoskeletal:         General: Normal range of " "motion.      Cervical back: Normal range of motion.   Skin:     General: Skin is warm and dry.   Neurological:      Mental Status: She is alert and oriented to person, place, and time.   Psychiatric:         Behavior: Behavior normal.          Laboratory:  CBC:  Recent Labs   Lab Result Units 09/28/23  0719   WBC K/uL 4.50   RBC M/uL 5.16   Hemoglobin g/dL 15.4   Hematocrit % 46.0   Platelets K/uL 226   MCV fL 89   MCH pg 29.8   MCHC g/dL 33.5       CMP:  Recent Labs   Lab Result Units 09/28/23  0719   Glucose mg/dL 109   Calcium mg/dL 9.5   Albumin g/dL 4.5   Total Protein g/dL 7.9   Sodium mmol/L 141   Potassium mmol/L 4.6   CO2 mmol/L 30*   Chloride mmol/L 104   BUN mg/dL 14   Alkaline Phosphatase U/L 78   ALT U/L 45*   AST U/L 33   Total Bilirubin mg/dL 0.6       URINALYSIS:  No results for input(s): "COLORU", "CLARITYU", "SPECGRAV", "PHUR", "PROTEINUA", "GLUCOSEU", "BILIRUBINCON", "BLOODU", "WBCU", "RBCU", "BACTERIA", "MUCUS", "NITRITE", "LEUKOCYTESUR", "UROBILINOGEN", "HYALINECASTS" in the last 2160 hours.   LIPIDS:  Recent Labs   Lab Result Units 09/28/23  0719   TSH uIU/mL 2.640   HDL mg/dL 58   Cholesterol mg/dL 175   Triglycerides mg/dL 112   LDL Cholesterol mg/dL 94.6   HDL/Cholesterol Ratio % 33.1   Non-HDL Cholesterol mg/dL 117   Total Cholesterol/HDL Ratio  3.0       TSH:  Recent Labs   Lab Result Units 09/28/23  0719   TSH uIU/mL 2.640       A1C:  Recent Labs   Lab Result Units 09/28/23 0719   Hemoglobin A1C % 5.3         Radiology:  No results found in the last 30 days.     Assessment/Plan     Yasmine Villa is a 57 y.o.female with:    1. Annual physical exam    2. Right elbow tendinitis    3. Migraine without aura and without status migrainosus, not intractable  - sumatriptan (IMITREX) 100 MG tablet; Take 1 tablet (100 mg total) by mouth every 2 (two) hours as needed for Migraine (can take 2nd tablet 2 hours later (not to exceed 2 tablets in 24 hours)).  Dispense: 9 tablet; Refill: 5    4. Anxiety and " depression  - ALPRAZolam (XANAX) 0.25 MG tablet; Take 1 tablet (0.25 mg total) by mouth daily as needed for Anxiety.  Dispense: 30 tablet; Refill: 1    5. Essential hypertension    6. Primary insomnia  - temazepam (RESTORIL) 30 mg capsule; Take 1 capsule (30 mg total) by mouth nightly.  Dispense: 30 capsule; Refill: 5    7. Class 1 obesity due to excess calories without serious comorbidity with body mass index (BMI) of 32.0 to 32.9 in adult    8. Encounter for screening mammogram for malignant neoplasm of breast  - Mammo Digital Screening Bilat w/ Guille; Future        -MMG ordered  -Continue current medications and maintain follow up with specialists.    -Follow up in about 1 year (around 10/6/2024) for Annual Visit.       Lynda Bobo MD  [facility]

## 2023-10-06 NOTE — ASSESSMENT & PLAN NOTE
Stable. Takes 1/2 xanax 1-2 times per week.   No panic attacks/SI/HI.  Lost last job but has a less stressful job now. Dad had stroke.    From previous visits: Zoloft made her apathetic. Lexapro/Cymbalta gave her diarrhea, wellbutrin caused hair loss.  Feels like situation with mom is the main  of her anxiety.

## 2023-11-30 ENCOUNTER — PATIENT MESSAGE (OUTPATIENT)
Dept: PRIMARY CARE CLINIC | Facility: CLINIC | Age: 58
End: 2023-11-30
Payer: COMMERCIAL

## 2023-12-01 RX ORDER — TRAZODONE HYDROCHLORIDE 50 MG/1
50 TABLET ORAL NIGHTLY
Qty: 30 TABLET | Refills: 11 | Status: SHIPPED | OUTPATIENT
Start: 2023-12-01 | End: 2024-11-30

## 2023-12-18 ENCOUNTER — PATIENT MESSAGE (OUTPATIENT)
Dept: PRIMARY CARE CLINIC | Facility: CLINIC | Age: 58
End: 2023-12-18
Payer: COMMERCIAL

## 2023-12-19 ENCOUNTER — E-VISIT (OUTPATIENT)
Dept: PRIMARY CARE CLINIC | Facility: CLINIC | Age: 58
End: 2023-12-19
Payer: COMMERCIAL

## 2023-12-19 DIAGNOSIS — N30.00 ACUTE CYSTITIS WITHOUT HEMATURIA: Primary | ICD-10-CM

## 2023-12-19 PROCEDURE — 99421 OL DIG E/M SVC 5-10 MIN: CPT | Mod: ,,, | Performed by: INTERNAL MEDICINE

## 2023-12-19 PROCEDURE — 99421 PR E&M, ONLINE DIGIT, EST, < 7 DAYS, 5-10 MINS: ICD-10-PCS | Mod: ,,, | Performed by: INTERNAL MEDICINE

## 2023-12-19 RX ORDER — PHENAZOPYRIDINE HYDROCHLORIDE 200 MG/1
200 TABLET, FILM COATED ORAL 3 TIMES DAILY PRN
Qty: 6 TABLET | Refills: 0 | Status: SHIPPED | OUTPATIENT
Start: 2023-12-19 | End: 2023-12-29

## 2023-12-19 RX ORDER — SULFAMETHOXAZOLE AND TRIMETHOPRIM 800; 160 MG/1; MG/1
1 TABLET ORAL 2 TIMES DAILY
Qty: 10 TABLET | Refills: 0 | Status: SHIPPED | OUTPATIENT
Start: 2023-12-19

## 2023-12-19 NOTE — PROGRESS NOTES
Patient ID: Yasmine Villa is a 57 y.o. female.    Chief Complaint: Urinary Tract Infection (Entered automatically based on patient selection in Patient Portal.)    The patient initiated a request through [employer] on 12/19/2023 for evaluation and management with a chief complaint of Urinary Tract Infection (Entered automatically based on patient selection in Patient Portal.)     I evaluated the questionnaire submission on 12/19/2023.    [facility] Peq Evisit Uti Questionnaire    12/19/2023  8:21 AM CST - Filed by Patient   Do you agree to participate in an E-Visit? Yes   If you have any of the following problems, please present to your local ER or call 911:  I acknowledge   What is the main issue that you would like for your doctor to address today? treatment for bladder infection   Are you able to take your vital signs? No   What symptoms do you currently have? Pain while passing urine   When did your symptoms first appear? 12/14/2023   List what you have done or taken to help your symptoms. ibuprofen, heating pad, cranberry juice   Please indicate whether you have had the following symptoms during the past 24 hours     Urgent urination (a sudden and uncontrollable urge to urinate) Moderate   Frequent urination of small amounts of urine (going to the toilet very often) Moderate   Burning pain when urinating Mild   Incomplete bladder emptying (still feel like you need to urinate again after urination) Moderate   Pain not associated with urination in the lower abdomen below the belly button) Moderate   What does your urine look like? Cloudy   Blood seen in the urine None   Flank pain (pain in one or both sides of the lower back) Moderate   Abnormal Vaginal Discharge (abnormal amount, color and/or odor) None   Discharge from the urethra (urinary opening) without urination Moderate   High body temperature/fever? None-<99.5   Please rate how much discomfort you have experience because of the symptoms in the past 24 hours: Moderate    Please indicate how the symptoms have interfered with your every day activities/work in the past 24 hours: Moderate   Please indicate how these symptoms have interfered with your social activities (visiting people, meeting with friends, etc.) in the past 24 hours? Moderate   Are you a diabetic? No   Please indicate whether you have the following at the time of completion of this questionnaire: None of the above   Provide any information you feel is important to your history not asked above none   Please attach any relevant images or files (if you have performed a home test for UTI, please submit a photo of results)          Recent Labs Obtained:  No visits with results within 7 Day(s) from this visit.   Latest known visit with results is:   Lab Visit on 09/28/2023   Component Date Value Ref Range Status    Hemoglobin A1C 09/28/2023 5.3  4.0 - 5.6 % Final    Comment: ADA Screening Guidelines:  5.7-6.4%  Consistent with prediabetes  >or=6.5%  Consistent with diabetes    High levels of fetal hemoglobin interfere with the HbA1C  assay. Heterozygous hemoglobin variants (HbS, HgC, etc)do  not significantly interfere with this assay.   However, presence of multiple variants may affect accuracy.      Estimated Avg Glucose 09/28/2023 105  68 - 131 mg/dL Final    TSH 09/28/2023 2.640  0.400 - 4.000 uIU/mL Final    Comment: Warning:  Heterophilic antibodies in serum or plasma of   certain individuals are known to cause interference with   immunoassays. These antibodies may be present in blood samples   from individuals regularly exposed to animal or who have been   treated with animal products.     Patients taking high doses of supplemental biotin may have  negatively biased results.       WBC 09/28/2023 4.50  3.90 - 12.70 K/uL Final    RBC 09/28/2023 5.16  4.00 - 5.40 M/uL Final    Hemoglobin 09/28/2023 15.4  12.0 - 16.0 g/dL Final    Hematocrit 09/28/2023 46.0  37.0 - 48.5 % Final    MCV 09/28/2023 89  82 - 98 fL Final     MCH 09/28/2023 29.8  27.0 - 31.0 pg Final    MCHC 09/28/2023 33.5  32.0 - 36.0 g/dL Final    RDW 09/28/2023 11.8  11.5 - 14.5 % Final    Platelets 09/28/2023 226  150 - 450 K/uL Final    MPV 09/28/2023 10.5  9.2 - 12.9 fL Final    Immature Granulocytes 09/28/2023 0.2  0.0 - 0.5 % Final    Gran # (ANC) 09/28/2023 2.3  1.8 - 7.7 K/uL Final    Immature Grans (Abs) 09/28/2023 0.01  0.00 - 0.04 K/uL Final    Comment: Mild elevation in immature granulocytes is non specific and   can be seen in a variety of conditions including stress response,   acute inflammation, trauma and pregnancy. Correlation with other   laboratory and clinical findings is essential.      Lymph # 09/28/2023 1.6  1.0 - 4.8 K/uL Final    Mono # 09/28/2023 0.4  0.3 - 1.0 K/uL Final    Eos # 09/28/2023 0.2  0.0 - 0.5 K/uL Final    Baso # 09/28/2023 0.05  0.00 - 0.20 K/uL Final    nRBC 09/28/2023 0  0 /100 WBC Final    Gran % 09/28/2023 50.2  38.0 - 73.0 % Final    Lymph % 09/28/2023 35.6  18.0 - 48.0 % Final    Mono % 09/28/2023 8.0  4.0 - 15.0 % Final    Eosinophil % 09/28/2023 4.9  0.0 - 8.0 % Final    Basophil % 09/28/2023 1.1  0.0 - 1.9 % Final    Differential Method 09/28/2023 Automated   Final    Cholesterol 09/28/2023 175  120 - 199 mg/dL Final    Comment: The National Cholesterol Education Program (NCEP) has set the  following guidelines (reference ranges) for Cholesterol:  Optimal.....................<200 mg/dL  Borderline High.............200-239 mg/dL  High........................> or = 240 mg/dL      Triglycerides 09/28/2023 112  30 - 150 mg/dL Final    Comment: The National Cholesterol Education Program (NCEP) has set the  following guidelines (reference values) for triglycerides:  Normal......................<150 mg/dL  Borderline High.............150-199 mg/dL  High........................200-499 mg/dL      HDL 09/28/2023 58  40 - 75 mg/dL Final    Comment: The National Cholesterol Education Program (NCEP) has set the  following  guidelines (reference values) for HDL Cholesterol:  Low...............<40 mg/dL  Optimal...........>60 mg/dL      LDL Cholesterol 09/28/2023 94.6  63.0 - 159.0 mg/dL Final    Comment: The National Cholesterol Education Program (NCEP) has set the  following guidelines (reference values) for LDL Cholesterol:  Optimal.......................<130 mg/dL  Borderline High...............130-159 mg/dL  High..........................160-189 mg/dL  Very High.....................>190 mg/dL      HDL/Cholesterol Ratio 09/28/2023 33.1  20.0 - 50.0 % Final    Total Cholesterol/HDL Ratio 09/28/2023 3.0  2.0 - 5.0 Final    Non-HDL Cholesterol 09/28/2023 117  mg/dL Final    Comment: Risk category and Non-HDL cholesterol goals:  Coronary heart disease (CHD)or equivalent (10-year risk of CHD >20%):  Non-HDL cholesterol goal     <130 mg/dL  Two or more CHD risk factors and 10-year risk of CHD <= 20%:  Non-HDL cholesterol goal     <160 mg/dL  0 to 1 CHD risk factor:  Non-HDL cholesterol goal     <190 mg/dL      Sodium 09/28/2023 141  136 - 145 mmol/L Final    Potassium 09/28/2023 4.6  3.5 - 5.1 mmol/L Final    Chloride 09/28/2023 104  95 - 110 mmol/L Final    CO2 09/28/2023 30 (H)  23 - 29 mmol/L Final    Glucose 09/28/2023 109  70 - 110 mg/dL Final    BUN 09/28/2023 14  7 - 17 mg/dL Final    Creatinine 09/28/2023 0.62  0.50 - 1.40 mg/dL Final    Calcium 09/28/2023 9.5  8.7 - 10.5 mg/dL Final    Total Protein 09/28/2023 7.9  6.0 - 8.4 g/dL Final    Albumin 09/28/2023 4.5  3.5 - 5.2 g/dL Final    Total Bilirubin 09/28/2023 0.6  0.1 - 1.0 mg/dL Final    Comment: For infants and newborns, interpretation of results should be based  on gestational age, weight and in agreement with clinical  observations.    Premature Infant recommended reference ranges:  Up to 24 hours.............<8.0 mg/dL  Up to 48 hours............<12.0 mg/dL  3-5 days..................<15.0 mg/dL  6-29 days.................<15.0 mg/dL      Alkaline Phosphatase 09/28/2023  78  38 - 126 U/L Final    AST 09/28/2023 33  15 - 46 U/L Final    ALT 09/28/2023 45 (H)  10 - 44 U/L Final    Anion Gap 09/28/2023 7 (L)  8 - 16 mmol/L Final    eGFR 09/28/2023 >60.0  >60 mL/min/1.73 m^2 Final       No diagnosis found.     No orders of the defined types were placed in this encounter.           No follow-ups on file.      E-Visit Time Tracking:

## 2024-04-16 ENCOUNTER — OFFICE VISIT (OUTPATIENT)
Dept: URGENT CARE | Facility: CLINIC | Age: 59
End: 2024-04-16
Payer: COMMERCIAL

## 2024-04-16 VITALS
BODY MASS INDEX: 30.73 KG/M2 | WEIGHT: 180 LBS | RESPIRATION RATE: 18 BRPM | TEMPERATURE: 98 F | SYSTOLIC BLOOD PRESSURE: 151 MMHG | DIASTOLIC BLOOD PRESSURE: 93 MMHG | HEIGHT: 64 IN | OXYGEN SATURATION: 96 % | HEART RATE: 94 BPM

## 2024-04-16 DIAGNOSIS — R09.81 SINUS CONGESTION: Primary | ICD-10-CM

## 2024-04-16 DIAGNOSIS — J30.9 ALLERGIC RHINITIS, UNSPECIFIED SEASONALITY, UNSPECIFIED TRIGGER: ICD-10-CM

## 2024-04-16 LAB
CTP QC/QA: YES
SARS-COV-2 AG RESP QL IA.RAPID: NEGATIVE

## 2024-04-16 PROCEDURE — 96372 THER/PROPH/DIAG INJ SC/IM: CPT | Mod: S$GLB,,, | Performed by: PHYSICIAN ASSISTANT

## 2024-04-16 PROCEDURE — 87811 SARS-COV-2 COVID19 W/OPTIC: CPT | Mod: QW,S$GLB,, | Performed by: PHYSICIAN ASSISTANT

## 2024-04-16 PROCEDURE — 99203 OFFICE O/P NEW LOW 30 MIN: CPT | Mod: 25,S$GLB,, | Performed by: PHYSICIAN ASSISTANT

## 2024-04-16 RX ORDER — DEXAMETHASONE SODIUM PHOSPHATE 100 MG/10ML
10 INJECTION INTRAMUSCULAR; INTRAVENOUS ONCE
Status: COMPLETED | OUTPATIENT
Start: 2024-04-16 | End: 2024-04-16

## 2024-04-16 RX ORDER — FLUTICASONE PROPIONATE 50 MCG
1 SPRAY, SUSPENSION (ML) NASAL DAILY
Qty: 16 G | Refills: 3 | Status: SHIPPED | OUTPATIENT
Start: 2024-04-16

## 2024-04-16 RX ORDER — AZELASTINE 1 MG/ML
1 SPRAY, METERED NASAL 2 TIMES DAILY
Qty: 30 ML | Refills: 0 | Status: SHIPPED | OUTPATIENT
Start: 2024-04-16 | End: 2025-04-16

## 2024-04-16 RX ORDER — CROMOLYN SODIUM 5.2 MG/ML
1 SPRAY, METERED NASAL 4 TIMES DAILY
Qty: 26 ML | Refills: 1 | Status: SHIPPED | OUTPATIENT
Start: 2024-04-16

## 2024-04-16 RX ORDER — DEXAMETHASONE SODIUM PHOSPHATE 100 MG/10ML
10 INJECTION INTRAMUSCULAR; INTRAVENOUS ONCE
Status: DISCONTINUED | OUTPATIENT
Start: 2024-04-16 | End: 2024-04-16

## 2024-04-16 RX ORDER — VITAMIN A 3000 MCG
2 CAPSULE ORAL
Qty: 60 ML | Refills: 12 | Status: SHIPPED | OUTPATIENT
Start: 2024-04-16

## 2024-04-16 RX ORDER — CETIRIZINE HYDROCHLORIDE 10 MG/1
10 TABLET ORAL DAILY
Qty: 30 TABLET | Refills: 1 | Status: SHIPPED | OUTPATIENT
Start: 2024-04-16 | End: 2025-04-16

## 2024-04-16 RX ORDER — PREDNISONE 10 MG/1
10 TABLET ORAL DAILY
Qty: 4 TABLET | Refills: 0 | Status: SHIPPED | OUTPATIENT
Start: 2024-04-16

## 2024-04-16 RX ADMIN — DEXAMETHASONE SODIUM PHOSPHATE 10 MG: 100 INJECTION INTRAMUSCULAR; INTRAVENOUS at 08:04

## 2024-04-16 NOTE — PROGRESS NOTES
"Subjective:      Patient ID: Yasmine Villa is a 58 y.o. female.    Vitals:  height is 5' 4" (1.626 m) and weight is 81.6 kg (180 lb). Her oral temperature is 98 °F (36.7 °C). Her blood pressure is 151/93 (abnormal) and her pulse is 94. Her respiration is 18 and oxygen saturation is 96%.     Chief Complaint: Sinus Problem    Pt states that on Thursday she started with a scratchy throat. States that on Friday it turned to a sore throat with a lot of sinus symptoms.  Patient also complained of clear runny eyes itchy eyes sneezing.  No fever chills    Sinus Problem  This is a new problem. The current episode started in the past 7 days (5 days ago). The problem is unchanged. There has been no fever. Her pain is at a severity of 4/10. The pain is mild. Associated symptoms include congestion, coughing, headaches, sinus pressure, sneezing and a sore throat. Pertinent negatives include no chills. Treatments tried: ibuprofen, otc sinus medication, flonase. The treatment provided no relief.       Constitution: Negative for chills, fatigue, fever and unexpected weight change.   HENT:  Positive for congestion, sinus pressure and sore throat.    Respiratory:  Positive for cough.    Skin:  Negative for erythema.   Allergic/Immunologic: Positive for itching and sneezing.   Neurological:  Positive for headaches.      Objective:     Physical Exam   Constitutional: She is oriented to person, place, and time. She appears well-developed. She is cooperative.  Non-toxic appearance. She does not appear ill. No distress.   HENT:   Head: Normocephalic and atraumatic.   Ears:   Right Ear: Hearing, tympanic membrane, external ear and ear canal normal.   Left Ear: Hearing, tympanic membrane, external ear and ear canal normal.   Nose: Rhinorrhea and congestion present. No mucosal edema or nasal deformity. No epistaxis. Right sinus exhibits no maxillary sinus tenderness and no frontal sinus tenderness. Left sinus exhibits no maxillary sinus " tenderness and no frontal sinus tenderness.   Mouth/Throat: Uvula is midline, oropharynx is clear and moist and mucous membranes are normal. No trismus in the jaw. Normal dentition. No uvula swelling. No oropharyngeal exudate, posterior oropharyngeal edema or posterior oropharyngeal erythema.   Eyes: EOM and lids are normal. Pupils are equal, round, and reactive to light. Right eye exhibits no discharge. Left eye exhibits no discharge. No scleral icterus. Extraocular movement intact      Comments: Conjunctiva injected slightly bilaterally with cleared tearing.  No purulent discharge.   Neck: Trachea normal and phonation normal. Neck supple. No JVD present. No tracheal deviation present. No thyromegaly present. No edema present. No erythema present. No neck rigidity present.   Cardiovascular: Normal rate, regular rhythm, normal heart sounds and normal pulses.   No murmur heard.Exam reveals no gallop and no friction rub.   Pulmonary/Chest: Effort normal and breath sounds normal. No stridor. No respiratory distress. She has no decreased breath sounds. She has no wheezes. She has no rhonchi. She has no rales. She exhibits no tenderness.   Abdominal: Normal appearance. She exhibits no distension. Soft. There is no abdominal tenderness. There is no rebound and no guarding.   Musculoskeletal: Normal range of motion.         General: No deformity. Normal range of motion.   Neurological: She is alert and oriented to person, place, and time. She exhibits normal muscle tone. Coordination normal.   Skin: Skin is warm, dry, intact, not diaphoretic, not pale and no rash. Capillary refill takes less than 2 seconds. No erythema   Psychiatric: Her speech is normal and behavior is normal. Judgment and thought content normal.   Nursing note and vitals reviewed.    Results for orders placed or performed in visit on 04/16/24   SARS Coronavirus 2 Antigen, POCT Manual Read   Result Value Ref Range    SARS Coronavirus 2 Antigen Negative  Negative     Acceptable Yes     No results found.   Assessment:     1. Sinus congestion    2. Allergic rhinitis, unspecified seasonality, unspecified trigger        Plan:       Sinus congestion  -     SARS Coronavirus 2 Antigen, POCT Manual Read  -     dexAMETHasone injection 10 mg  -     predniSONE (DELTASONE) 10 MG tablet; Take 1 tablet (10 mg total) by mouth once daily.  Dispense: 4 tablet; Refill: 0  -     fluticasone propionate (FLONASE) 50 mcg/actuation nasal spray; 1 spray (50 mcg total) by Each Nostril route once daily.  Dispense: 16 g; Refill: 3  -     azelastine (ASTELIN) 137 mcg (0.1 %) nasal spray; 1 spray (137 mcg total) by Nasal route 2 (two) times daily.  Dispense: 30 mL; Refill: 0  -     cromolyn (NASALCHROM) 5.2 mg/spray (4 %) nasal spray; 1 spray by Nasal route 4 (four) times daily.  Dispense: 26 mL; Refill: 1  -     sodium chloride (SALINE NASAL) 0.65 % nasal spray; 2 sprays by Nasal route as needed for Congestion.  Dispense: 60 mL; Refill: 12  -     cetirizine (ZYRTEC) 10 MG tablet; Take 1 tablet (10 mg total) by mouth once daily.  Dispense: 30 tablet; Refill: 1    Allergic rhinitis, unspecified seasonality, unspecified trigger  -     dexAMETHasone injection 10 mg  -     predniSONE (DELTASONE) 10 MG tablet; Take 1 tablet (10 mg total) by mouth once daily.  Dispense: 4 tablet; Refill: 0  -     fluticasone propionate (FLONASE) 50 mcg/actuation nasal spray; 1 spray (50 mcg total) by Each Nostril route once daily.  Dispense: 16 g; Refill: 3  -     azelastine (ASTELIN) 137 mcg (0.1 %) nasal spray; 1 spray (137 mcg total) by Nasal route 2 (two) times daily.  Dispense: 30 mL; Refill: 0  -     cromolyn (NASALCHROM) 5.2 mg/spray (4 %) nasal spray; 1 spray by Nasal route 4 (four) times daily.  Dispense: 26 mL; Refill: 1  -     sodium chloride (SALINE NASAL) 0.65 % nasal spray; 2 sprays by Nasal route as needed for Congestion.  Dispense: 60 mL; Refill: 12  -     cetirizine (ZYRTEC) 10 MG  tablet; Take 1 tablet (10 mg total) by mouth once daily.  Dispense: 30 tablet; Refill: 1    Follow up if symptoms worsen or fail to improve, for F/U with PCP or ED. There are no Patient Instructions on file for this visit.

## 2024-04-16 NOTE — LETTER
April 16, 2024      [facility] [place]  [address] [address], [address]  [place] [address]  Phone: [phone]  Fax: [phone]       Patient: Yasmine Villa   YOB: 1965  Date of Visit: 04/16/2024    To Whom It May Concern:    Yobani Villa  was at [facility] on 04/16/2024. The patient may return to work/school on 04/17/2024 with no restrictions. If you have any questions or concerns, or if I can be of further assistance, please do not hesitate to contact me.    Sincerely,    Adis Patton PA

## 2024-05-20 ENCOUNTER — OFFICE VISIT (OUTPATIENT)
Dept: OBSTETRICS AND GYNECOLOGY | Facility: CLINIC | Age: 59
End: 2024-05-20
Payer: COMMERCIAL

## 2024-05-20 VITALS
WEIGHT: 186.31 LBS | SYSTOLIC BLOOD PRESSURE: 150 MMHG | DIASTOLIC BLOOD PRESSURE: 103 MMHG | BODY MASS INDEX: 31.98 KG/M2

## 2024-05-20 DIAGNOSIS — Z01.419 WELL WOMAN EXAM WITH ROUTINE GYNECOLOGICAL EXAM: Primary | ICD-10-CM

## 2024-05-20 DIAGNOSIS — Z12.31 ENCOUNTER FOR SCREENING MAMMOGRAM FOR MALIGNANT NEOPLASM OF BREAST: ICD-10-CM

## 2024-05-20 PROCEDURE — 3008F BODY MASS INDEX DOCD: CPT | Mod: CPTII,S$GLB,, | Performed by: OBSTETRICS & GYNECOLOGY

## 2024-05-20 PROCEDURE — 1159F MED LIST DOCD IN RCRD: CPT | Mod: CPTII,S$GLB,, | Performed by: OBSTETRICS & GYNECOLOGY

## 2024-05-20 PROCEDURE — 99396 PREV VISIT EST AGE 40-64: CPT | Mod: S$GLB,,, | Performed by: OBSTETRICS & GYNECOLOGY

## 2024-05-20 PROCEDURE — 99999 PR PBB SHADOW E&M-EST. PATIENT-LVL III: CPT | Mod: PBBFAC,,, | Performed by: OBSTETRICS & GYNECOLOGY

## 2024-05-20 PROCEDURE — 88175 CYTOPATH C/V AUTO FLUID REDO: CPT | Performed by: OBSTETRICS & GYNECOLOGY

## 2024-05-20 PROCEDURE — 3077F SYST BP >= 140 MM HG: CPT | Mod: CPTII,S$GLB,, | Performed by: OBSTETRICS & GYNECOLOGY

## 2024-05-20 PROCEDURE — 3080F DIAST BP >= 90 MM HG: CPT | Mod: CPTII,S$GLB,, | Performed by: OBSTETRICS & GYNECOLOGY

## 2024-05-20 NOTE — PROGRESS NOTES
CC: Annual check-up    SUBJECTIVE:   58 y.o. female   for annual routine Pap and checkup. Patient's last menstrual period was 10/01/2021 (exact date)..  She has no unusual complaints. PAP  NIL but high risk HPV type 16 +  Colpo  1:00 O'clock OKSANA 1.  sx include hot flashes and fatigue but pt tolerates them fine, not on HRT currently.       Past Medical History:   Diagnosis Date    Migraines     Personal history of colonic polyps 2021     Past Surgical History:   Procedure Laterality Date    AUGMENTATION OF BREAST  1999    BREAST SURGERY       SECTION      COLONOSCOPY N/A 2021    Procedure: COLONOSCOPY;  Surgeon: Andie Alonzo MD;  Location: [facility];  Service: Endoscopy;  Laterality: N/A;     Social History     Socioeconomic History    Marital status: Single   Tobacco Use    Smoking status: Never    Smokeless tobacco: Never   Substance and Sexual Activity    Alcohol use: No    Drug use: No    Sexual activity: Not Currently     Partners: Male     Birth control/protection: None   Social History Narrative    Working at [employer]. Daughter (16) lives at home with her. Active in sports her whole life. Is  from her .      Family History   Problem Relation Name Age of Onset    Menstrual problems Mother Noa Mujica     Arthritis Mother Noa Mujica     Asthma Mother Noa Mujica     Depression Mother Noa Mujica     Diabetes Mother Noa Mujica     Hypertension Mother Noa Mujica     Arthritis Father Raheem Arreola     Hearing loss Father Raheem Arreola     Hypertension Father Raheem Arreola     Stroke Father Raheem Juarezaida     Heart disease Neg Hx      Cancer Neg Hx      Breast cancer Neg Hx      Ovarian cancer Neg Hx      Colon cancer Neg Hx       OB History    Para Term  AB Living   1 1 1     1   SAB IAB Ectopic Multiple Live Births           1      # Outcome Date GA Lbr Prashanth/2nd Weight Sex Type Anes PTL Lv   1 Term     F  CS-Unspec   MAXIMUS      Obstetric Comments   13/R/4   No abnormal pap   No STDs   Menopause 54         Current Outpatient Medications   Medication Sig Dispense Refill    azelastine (ASTELIN) 137 mcg (0.1 %) nasal spray 1 spray (137 mcg total) by Nasal route 2 (two) times daily. 30 mL 0    cetirizine (ZYRTEC) 10 MG tablet Take 1 tablet (10 mg total) by mouth once daily. 30 tablet 1    cromolyn (NASALCHROM) 5.2 mg/spray (4 %) nasal spray 1 spray by Nasal route 4 (four) times daily. 26 mL 1    fluticasone propionate (FLONASE) 50 mcg/actuation nasal spray 1 spray (50 mcg total) by Each Nostril route once daily. 16 g 1    fluticasone propionate (FLONASE) 50 mcg/actuation nasal spray 1 spray (50 mcg total) by Each Nostril route once daily. 16 g 3    predniSONE (DELTASONE) 10 MG tablet Take 1 tablet (10 mg total) by mouth once daily. 4 tablet 0    sodium chloride (SALINE NASAL) 0.65 % nasal spray 2 sprays by Nasal route as needed for Congestion. 60 mL 12    sulfamethoxazole-trimethoprim 800-160mg (BACTRIM DS) 800-160 mg Tab Take 1 tablet by mouth 2 (two) times daily. 10 tablet 0    sumatriptan (IMITREX) 100 MG tablet Take 1 tablet (100 mg total) by mouth every 2 (two) hours as needed for Migraine (can take 2nd tablet 2 hours later (not to exceed 2 tablets in 24 hours)). 9 tablet 5    temazepam (RESTORIL) 30 mg capsule Take 1 capsule (30 mg total) by mouth nightly. 30 capsule 5    traZODone (DESYREL) 50 MG tablet Take 1 tablet (50 mg total) by mouth every evening. 30 tablet 11    ALPRAZolam (XANAX) 0.25 MG tablet Take 1 tablet (0.25 mg total) by mouth daily as needed for Anxiety. 30 tablet 1     No current facility-administered medications for this visit.     Allergies: Pcn [penicillins] and Wellbutrin [bupropion hcl]     ROS:  Constitutional: no weight loss, weight gain, fever, fatigue  Eyes:  No vision changes, glasses/contacts  ENT/Mouth: No ulcers, sinus problems, ears ringing, headache  Cardiovascular: No inability to lie  flat, chest pain, exercise intolerance, swelling, heart palpitations  Respiratory: No wheezing, coughing blood, shortness of breath, or cough  Gastrointestinal: No diarrhea, bloody stool, nausea/vomiting, constipation, gas, hemorrhoids  Genitourinary: No blood in urine, painful urination, urgency of urination, frequency of urination, incomplete emptying, incontinence, abnormal bleeding, painful periods, heavy periods, vaginal discharge, vaginal odor, painful intercourse, sexual problems, bleeding after intercourse.  Musculoskeletal: No muscle weakness  Skin/Breast: No painful breasts, nipple discharge, masses, rash, ulcers  Neurological: No passing out, seizures, numbness, headache  Endocrine: No diabetes, hypothyroid, hyperthyroid, hot flashes, hair loss, abnormal hair growth, ance  Psychiatric: No depression, crying  Hematologic: No bruises, bleeding, swollen lymph nodes, anemia.      OBJECTIVE:   The patient appears well, alert, oriented x 3, in no distress.  BP (!) 150/103   Wt 84.5 kg (186 lb 4.6 oz)   LMP 10/01/2021 (Exact Date)   BMI 31.98 kg/m²   NECK: no thyromegaly, trachea midline  SKIN: no acne, striae, hirsutism  BREAST EXAM: breasts appear normal, no suspicious masses, no skin or nipple changes or axillary nodes  ABDOMEN: no hernias, masses, or hepatosplenomegaly  GENITALIA: normal external genitalia, no erythema, no discharge  URETHRA: normal urethra, normal urethral meatus  VAGINA: Normal  CERVIX: no lesions or cervical motion tenderness  UTERUS: normal  ADNEXA: normal adnexa      ASSESSMENT:   well woman  1. Well woman exam with routine gynecological exam    2. Encounter for screening mammogram for malignant neoplasm of breast    Does not desire HRT for mammogram  Will follow up with pcp for elevated BP    PLAN:   mammogram  pap smear  return annually or prn  Orders Placed This Encounter    Mammo Digital Screening Bilat    Liquid-Based Pap Smear, Screening

## 2024-09-18 ENCOUNTER — OFFICE VISIT (OUTPATIENT)
Dept: ORTHOPEDICS | Facility: CLINIC | Age: 59
End: 2024-09-18
Payer: COMMERCIAL

## 2024-09-18 ENCOUNTER — LAB VISIT (OUTPATIENT)
Dept: LAB | Facility: HOSPITAL | Age: 59
End: 2024-09-18
Attending: ORTHOPAEDIC SURGERY
Payer: COMMERCIAL

## 2024-09-18 ENCOUNTER — ANESTHESIA EVENT (OUTPATIENT)
Dept: SURGERY | Facility: HOSPITAL | Age: 59
End: 2024-09-18
Payer: COMMERCIAL

## 2024-09-18 VITALS — BODY MASS INDEX: 32.93 KG/M2 | HEIGHT: 64 IN | WEIGHT: 192.88 LBS

## 2024-09-18 DIAGNOSIS — Z01.818 PRE-OP TESTING: ICD-10-CM

## 2024-09-18 DIAGNOSIS — S42.302A CLOSED FRACTURE OF SHAFT OF LEFT HUMERUS, UNSPECIFIED FRACTURE MORPHOLOGY, INITIAL ENCOUNTER: Primary | ICD-10-CM

## 2024-09-18 DIAGNOSIS — S42.202A CLOSED FRACTURE OF PROXIMAL END OF LEFT HUMERUS, UNSPECIFIED FRACTURE MORPHOLOGY, INITIAL ENCOUNTER: ICD-10-CM

## 2024-09-18 LAB
ANION GAP SERPL CALC-SCNC: 11 MMOL/L (ref 8–16)
BASOPHILS # BLD AUTO: 0.03 K/UL (ref 0–0.2)
BASOPHILS NFR BLD: 0.4 % (ref 0–1.9)
BUN SERPL-MCNC: 13 MG/DL (ref 6–20)
CALCIUM SERPL-MCNC: 9.7 MG/DL (ref 8.7–10.5)
CHLORIDE SERPL-SCNC: 102 MMOL/L (ref 95–110)
CO2 SERPL-SCNC: 26 MMOL/L (ref 23–29)
CREAT SERPL-MCNC: 0.9 MG/DL (ref 0.5–1.4)
DIFFERENTIAL METHOD BLD: NORMAL
EOSINOPHIL # BLD AUTO: 0.1 K/UL (ref 0–0.5)
EOSINOPHIL NFR BLD: 1.1 % (ref 0–8)
ERYTHROCYTE [DISTWIDTH] IN BLOOD BY AUTOMATED COUNT: 11.9 % (ref 11.5–14.5)
EST. GFR  (NO RACE VARIABLE): >60 ML/MIN/1.73 M^2
GLUCOSE SERPL-MCNC: 118 MG/DL (ref 70–110)
HCT VFR BLD AUTO: 38.1 % (ref 37–48.5)
HGB BLD-MCNC: 12.8 G/DL (ref 12–16)
IMM GRANULOCYTES # BLD AUTO: 0.02 K/UL (ref 0–0.04)
IMM GRANULOCYTES NFR BLD AUTO: 0.3 % (ref 0–0.5)
LYMPHOCYTES # BLD AUTO: 1.7 K/UL (ref 1–4.8)
LYMPHOCYTES NFR BLD: 23.5 % (ref 18–48)
MCH RBC QN AUTO: 30 PG (ref 27–31)
MCHC RBC AUTO-ENTMCNC: 33.6 G/DL (ref 32–36)
MCV RBC AUTO: 89 FL (ref 82–98)
MONOCYTES # BLD AUTO: 0.6 K/UL (ref 0.3–1)
MONOCYTES NFR BLD: 8.6 % (ref 4–15)
NEUTROPHILS # BLD AUTO: 4.9 K/UL (ref 1.8–7.7)
NEUTROPHILS NFR BLD: 66.1 % (ref 38–73)
NRBC BLD-RTO: 0 /100 WBC
PLATELET # BLD AUTO: 214 K/UL (ref 150–450)
PMV BLD AUTO: 10.4 FL (ref 9.2–12.9)
POTASSIUM SERPL-SCNC: 3.6 MMOL/L (ref 3.5–5.1)
RBC # BLD AUTO: 4.27 M/UL (ref 4–5.4)
SODIUM SERPL-SCNC: 139 MMOL/L (ref 136–145)
WBC # BLD AUTO: 7.41 K/UL (ref 3.9–12.7)

## 2024-09-18 PROCEDURE — 1159F MED LIST DOCD IN RCRD: CPT | Mod: CPTII,S$GLB,, | Performed by: ORTHOPAEDIC SURGERY

## 2024-09-18 PROCEDURE — 99999 PR PBB SHADOW E&M-EST. PATIENT-LVL V: CPT | Mod: PBBFAC,,, | Performed by: ORTHOPAEDIC SURGERY

## 2024-09-18 PROCEDURE — 3008F BODY MASS INDEX DOCD: CPT | Mod: CPTII,S$GLB,, | Performed by: ORTHOPAEDIC SURGERY

## 2024-09-18 PROCEDURE — 99204 OFFICE O/P NEW MOD 45 MIN: CPT | Mod: 57,S$GLB,, | Performed by: ORTHOPAEDIC SURGERY

## 2024-09-18 PROCEDURE — 80048 BASIC METABOLIC PNL TOTAL CA: CPT | Performed by: ORTHOPAEDIC SURGERY

## 2024-09-18 PROCEDURE — 36415 COLL VENOUS BLD VENIPUNCTURE: CPT | Performed by: ORTHOPAEDIC SURGERY

## 2024-09-18 PROCEDURE — 85025 COMPLETE CBC W/AUTO DIFF WBC: CPT | Performed by: ORTHOPAEDIC SURGERY

## 2024-09-18 PROCEDURE — 1160F RVW MEDS BY RX/DR IN RCRD: CPT | Mod: CPTII,S$GLB,, | Performed by: ORTHOPAEDIC SURGERY

## 2024-09-18 RX ORDER — SODIUM CHLORIDE 9 MG/ML
INJECTION, SOLUTION INTRAVENOUS CONTINUOUS
Status: CANCELLED | OUTPATIENT
Start: 2024-09-18

## 2024-09-18 RX ORDER — CEFAZOLIN SODIUM 2 G/50ML
2 SOLUTION INTRAVENOUS
Status: CANCELLED | OUTPATIENT
Start: 2024-09-18

## 2024-09-18 NOTE — H&P (VIEW-ONLY)
Patient ID:   Yasmine Villa is a 58 y.o. female.    Chief Complaint:   Left humerus fracture    HPI:   The patient is a 58-year-old right-hand dominant female who sustained a fall yesterday injuring her left upper extremity.  She presented to the emergency department where x-rays revealed that she had a humeral shaft fracture.  She was placed into a sling and swath.  She currently denies any numbness or paresthesias in her left upper extremity.  She denies any prior injuries or pain in her left upper extremity.  Her pain level today is 5/10.  She is here today with her daughter.    Medications:    Current Outpatient Medications:     azelastine (ASTELIN) 137 mcg (0.1 %) nasal spray, 1 spray (137 mcg total) by Nasal route 2 (two) times daily., Disp: 30 mL, Rfl: 0    cetirizine (ZYRTEC) 10 MG tablet, Take 1 tablet (10 mg total) by mouth once daily., Disp: 30 tablet, Rfl: 1    cromolyn (NASALCHROM) 5.2 mg/spray (4 %) nasal spray, 1 spray by Nasal route 4 (four) times daily., Disp: 26 mL, Rfl: 1    fluticasone propionate (FLONASE) 50 mcg/actuation nasal spray, 1 spray (50 mcg total) by Each Nostril route once daily., Disp: 16 g, Rfl: 1    fluticasone propionate (FLONASE) 50 mcg/actuation nasal spray, 1 spray (50 mcg total) by Each Nostril route once daily., Disp: 16 g, Rfl: 3    oxyCODONE-acetaminophen (PERCOCET) 5-325 mg per tablet, Take 1 tablet by mouth every 6 (six) hours as needed for Pain., Disp: 15 tablet, Rfl: 0    predniSONE (DELTASONE) 10 MG tablet, Take 1 tablet (10 mg total) by mouth once daily., Disp: 4 tablet, Rfl: 0    sodium chloride (SALINE NASAL) 0.65 % nasal spray, 2 sprays by Nasal route as needed for Congestion., Disp: 60 mL, Rfl: 12    sulfamethoxazole-trimethoprim 800-160mg (BACTRIM DS) 800-160 mg Tab, Take 1 tablet by mouth 2 (two) times daily., Disp: 10 tablet, Rfl: 0    sumatriptan (IMITREX) 100 MG tablet, Take 1 tablet (100 mg total) by mouth every 2 (two) hours as needed for Migraine (can take 2nd  tablet 2 hours later (not to exceed 2 tablets in 24 hours))., Disp: 9 tablet, Rfl: 5    temazepam (RESTORIL) 30 mg capsule, Take 1 capsule (30 mg total) by mouth nightly., Disp: 30 capsule, Rfl: 5    traZODone (DESYREL) 50 MG tablet, Take 1 tablet (50 mg total) by mouth every evening., Disp: 30 tablet, Rfl: 11    ALPRAZolam (XANAX) 0.25 MG tablet, Take 1 tablet (0.25 mg total) by mouth daily as needed for Anxiety., Disp: 30 tablet, Rfl: 1  No current facility-administered medications for this visit.    Allergies:  Review of patient's allergies indicates:   Allergen Reactions    Pcn [penicillins]     Wellbutrin [bupropion hcl]      Hair loss       Past Medical History:  Past Medical History:   Diagnosis Date    Migraines     Personal history of colonic polyps 2021        Past Surgical History:  Past Surgical History:   Procedure Laterality Date    AUGMENTATION OF BREAST  1999    BREAST SURGERY       SECTION      COLONOSCOPY N/A 2021    Procedure: COLONOSCOPY;  Surgeon: Andie Alonzo MD;  Location: [facility];  Service: Endoscopy;  Laterality: N/A;       Social History:  Social History     Occupational History    Not on file   Tobacco Use    Smoking status: Never    Smokeless tobacco: Never   Substance and Sexual Activity    Alcohol use: No    Drug use: No    Sexual activity: Not Currently     Partners: Male     Birth control/protection: None       Family History:  Family History   Problem Relation Name Age of Onset    Menstrual problems Mother Noa Mujica     Arthritis Mother Noa Mujica     Asthma Mother Noa Mujica     Depression Mother Noa Mujica     Diabetes Mother Noa Mujica     Hypertension Mother Noa Mujica     Arthritis Father Raheem Arreola     Hearing loss Father Raheem Arreola     Hypertension Father Raheem Arreola     Stroke Father Raheem Arreola     Heart disease Neg Hx      Cancer Neg Hx      Breast cancer Neg Hx      Ovarian cancer Neg Hx      Colon cancer  "Neg Hx          ROS:  Review of Systems   Musculoskeletal:  Positive for falls, joint swelling and myalgias.   Neurological:  Negative for numbness and paresthesias.   All other systems reviewed and are negative.      Vitals:  Ht 5' 4" (1.626 m)   Wt 87.5 kg (192 lb 14.4 oz)   LMP 10/01/2021 (Exact Date)   BMI 33.11 kg/m²     Physical Examination:  Comprehensive Orthopaedic Musculoskeletal Exam    General      Constitutional: appears stated age, mildly obese, well-developed and well-nourished    Scleral icterus: no    Labored breathing: no    Psychiatric: normal mood and affect and no acute distress    Neurological: alert and oriented x3    Skin: intact    Lymphadenopathy: none     Ortho Exam   Left upper extremity exam:   There is swelling in the left arm.  The skin is intact.  There is tenderness to palpation over the arm.  Light touch is intact in the deltoid, musculocutaneous, radial, ulnar, and median distributions.  She fires her deltoid, biceps, and triceps.  She has intact wrist extension, finger extension, wrist flexion, finger flexion.  There is a 2+ radial pulse at the level of the wrist.    Imaging:  I have independently reviewed the following imaging studies performed at [facility]:    X-Ray Humerus 2 View Left  Narrative: EXAMINATION:  XR HUMERUS 2 VIEW LEFT    CLINICAL HISTORY:  fall;  Pain in left upper arm    TECHNIQUE:  Three views of the left shoulder    COMPARISON:  None    FINDINGS:  There is an acute mild to moderately displaced, comminuted, fracture of the proximal and mid left humeral diaphysis.  There is approximately 1.5 cm lateral displacement of the more distal left humerus.  The left humeral head remains appropriately seated within the glenoid without evidence of glenohumeral dislocation.  The acromioclavicular joint is maintained.  Impression: Acute fracture of the proximal and mid left humeral diaphysis as above.    This report was flagged in Epic as abnormal.    Electronically signed " by: Ayan Lennon MD  Date:    09/17/2024  Time:    17:46    Assessment:  1. Closed fracture of shaft of left humerus, unspecified fracture morphology, initial encounter    2. Closed fracture of proximal end of left humerus, unspecified fracture morphology, initial encounter    3. Pre-op testing      Plan:  I have reviewed the findings in detail with the patient.  I have discussed both non operative and operative options.  I told her my recommendation would be for open reduction internal fixation of her left humerus.  I have reviewed the risks versus the benefits.  We will plan to proceed tomorrow.  She will go for preoperative testing to include CBC, basic metabolic panel, and EKG.    Orders Placed This Encounter    CBC Auto Differential    Basic Metabolic Panel    Diet NPO    Cleanse with Chlorhexidine (CHG)    Place DAVID hose    Place sequential compression device    SCHEDULED EKG 12-LEAD (to Muse)    IP VTE LOW RISK PATIENT    Case Request Operating Room: ORIF, FRACTURE, HUMERUS     No follow-ups on file.

## 2024-09-18 NOTE — PROGRESS NOTES
Patient ID:   Yasmine Villa is a 58 y.o. female.    Chief Complaint:   Left humerus fracture    HPI:   The patient is a 58-year-old right-hand dominant female who sustained a fall yesterday injuring her left upper extremity.  She presented to the emergency department where x-rays revealed that she had a humeral shaft fracture.  She was placed into a sling and swath.  She currently denies any numbness or paresthesias in her left upper extremity.  She denies any prior injuries or pain in her left upper extremity.  Her pain level today is 5/10.  She is here today with her daughter.    Medications:    Current Outpatient Medications:     azelastine (ASTELIN) 137 mcg (0.1 %) nasal spray, 1 spray (137 mcg total) by Nasal route 2 (two) times daily., Disp: 30 mL, Rfl: 0    cetirizine (ZYRTEC) 10 MG tablet, Take 1 tablet (10 mg total) by mouth once daily., Disp: 30 tablet, Rfl: 1    cromolyn (NASALCHROM) 5.2 mg/spray (4 %) nasal spray, 1 spray by Nasal route 4 (four) times daily., Disp: 26 mL, Rfl: 1    fluticasone propionate (FLONASE) 50 mcg/actuation nasal spray, 1 spray (50 mcg total) by Each Nostril route once daily., Disp: 16 g, Rfl: 1    fluticasone propionate (FLONASE) 50 mcg/actuation nasal spray, 1 spray (50 mcg total) by Each Nostril route once daily., Disp: 16 g, Rfl: 3    oxyCODONE-acetaminophen (PERCOCET) 5-325 mg per tablet, Take 1 tablet by mouth every 6 (six) hours as needed for Pain., Disp: 15 tablet, Rfl: 0    predniSONE (DELTASONE) 10 MG tablet, Take 1 tablet (10 mg total) by mouth once daily., Disp: 4 tablet, Rfl: 0    sodium chloride (SALINE NASAL) 0.65 % nasal spray, 2 sprays by Nasal route as needed for Congestion., Disp: 60 mL, Rfl: 12    sulfamethoxazole-trimethoprim 800-160mg (BACTRIM DS) 800-160 mg Tab, Take 1 tablet by mouth 2 (two) times daily., Disp: 10 tablet, Rfl: 0    sumatriptan (IMITREX) 100 MG tablet, Take 1 tablet (100 mg total) by mouth every 2 (two) hours as needed for Migraine (can take 2nd  tablet 2 hours later (not to exceed 2 tablets in 24 hours))., Disp: 9 tablet, Rfl: 5    temazepam (RESTORIL) 30 mg capsule, Take 1 capsule (30 mg total) by mouth nightly., Disp: 30 capsule, Rfl: 5    traZODone (DESYREL) 50 MG tablet, Take 1 tablet (50 mg total) by mouth every evening., Disp: 30 tablet, Rfl: 11    ALPRAZolam (XANAX) 0.25 MG tablet, Take 1 tablet (0.25 mg total) by mouth daily as needed for Anxiety., Disp: 30 tablet, Rfl: 1  No current facility-administered medications for this visit.    Allergies:  Review of patient's allergies indicates:   Allergen Reactions    Pcn [penicillins]     Wellbutrin [bupropion hcl]      Hair loss       Past Medical History:  Past Medical History:   Diagnosis Date    Migraines     Personal history of colonic polyps 2021        Past Surgical History:  Past Surgical History:   Procedure Laterality Date    AUGMENTATION OF BREAST  1999    BREAST SURGERY       SECTION      COLONOSCOPY N/A 2021    Procedure: COLONOSCOPY;  Surgeon: Andie Alonzo MD;  Location: [facility];  Service: Endoscopy;  Laterality: N/A;       Social History:  Social History     Occupational History    Not on file   Tobacco Use    Smoking status: Never    Smokeless tobacco: Never   Substance and Sexual Activity    Alcohol use: No    Drug use: No    Sexual activity: Not Currently     Partners: Male     Birth control/protection: None       Family History:  Family History   Problem Relation Name Age of Onset    Menstrual problems Mother Noa Mujica     Arthritis Mother Noa Mujica     Asthma Mother Noa Mujica     Depression Mother Noa Mujica     Diabetes Mother Noa Mujica     Hypertension Mother Noa Mujica     Arthritis Father Raheem Arreola     Hearing loss Father Raheem Arreola     Hypertension Father Raheem Arreola     Stroke Father Raheem Arreola     Heart disease Neg Hx      Cancer Neg Hx      Breast cancer Neg Hx      Ovarian cancer Neg Hx      Colon cancer  "Neg Hx          ROS:  Review of Systems   Musculoskeletal:  Positive for falls, joint swelling and myalgias.   Neurological:  Negative for numbness and paresthesias.   All other systems reviewed and are negative.      Vitals:  Ht 5' 4" (1.626 m)   Wt 87.5 kg (192 lb 14.4 oz)   LMP 10/01/2021 (Exact Date)   BMI 33.11 kg/m²     Physical Examination:  Comprehensive Orthopaedic Musculoskeletal Exam    General      Constitutional: appears stated age, mildly obese, well-developed and well-nourished    Scleral icterus: no    Labored breathing: no    Psychiatric: normal mood and affect and no acute distress    Neurological: alert and oriented x3    Skin: intact    Lymphadenopathy: none     Ortho Exam   Left upper extremity exam:   There is swelling in the left arm.  The skin is intact.  There is tenderness to palpation over the arm.  Light touch is intact in the deltoid, musculocutaneous, radial, ulnar, and median distributions.  She fires her deltoid, biceps, and triceps.  She has intact wrist extension, finger extension, wrist flexion, finger flexion.  There is a 2+ radial pulse at the level of the wrist.    Imaging:  I have independently reviewed the following imaging studies performed at [facility]:    X-Ray Humerus 2 View Left  Narrative: EXAMINATION:  XR HUMERUS 2 VIEW LEFT    CLINICAL HISTORY:  fall;  Pain in left upper arm    TECHNIQUE:  Three views of the left shoulder    COMPARISON:  None    FINDINGS:  There is an acute mild to moderately displaced, comminuted, fracture of the proximal and mid left humeral diaphysis.  There is approximately 1.5 cm lateral displacement of the more distal left humerus.  The left humeral head remains appropriately seated within the glenoid without evidence of glenohumeral dislocation.  The acromioclavicular joint is maintained.  Impression: Acute fracture of the proximal and mid left humeral diaphysis as above.    This report was flagged in Epic as abnormal.    Electronically signed " by: Ayan Lennon MD  Date:    09/17/2024  Time:    17:46    Assessment:  1. Closed fracture of shaft of left humerus, unspecified fracture morphology, initial encounter    2. Closed fracture of proximal end of left humerus, unspecified fracture morphology, initial encounter    3. Pre-op testing      Plan:  I have reviewed the findings in detail with the patient.  I have discussed both non operative and operative options.  I told her my recommendation would be for open reduction internal fixation of her left humerus.  I have reviewed the risks versus the benefits.  We will plan to proceed tomorrow.  She will go for preoperative testing to include CBC, basic metabolic panel, and EKG.    Orders Placed This Encounter    CBC Auto Differential    Basic Metabolic Panel    Diet NPO    Cleanse with Chlorhexidine (CHG)    Place DAVID hose    Place sequential compression device    SCHEDULED EKG 12-LEAD (to Muse)    IP VTE LOW RISK PATIENT    Case Request Operating Room: ORIF, FRACTURE, HUMERUS     No follow-ups on file.

## 2024-09-18 NOTE — PATIENT INSTRUCTIONS
Pre-Operative Cleansing:  It is recommended that you wash the shoulder with benzoyl peroxide soap daily starting 5 days before surgery. Studies show that this regimen decreases the amount of certain bacteria that naturally reside on the skin and may lessen your risk of infection.     It is recommended that you wash the surgical site with Hibiclens (chlorhexidine gluconate solution) the night before and the morning of surgery. Studies show that this regimen decreases the amount of certain bacteria that naturally reside on the skin and may lessen your risk of infection.

## 2024-09-19 ENCOUNTER — HOSPITAL ENCOUNTER (OUTPATIENT)
Facility: HOSPITAL | Age: 59
Discharge: HOME | End: 2024-09-19
Attending: ORTHOPAEDIC SURGERY | Admitting: ORTHOPAEDIC SURGERY
Payer: COMMERCIAL

## 2024-09-19 ENCOUNTER — ANESTHESIA (OUTPATIENT)
Dept: SURGERY | Facility: HOSPITAL | Age: 59
End: 2024-09-19
Payer: COMMERCIAL

## 2024-09-19 VITALS
WEIGHT: 192.88 LBS | TEMPERATURE: 99 F | OXYGEN SATURATION: 98 % | RESPIRATION RATE: 17 BRPM | BODY MASS INDEX: 32.93 KG/M2 | HEIGHT: 64 IN | HEART RATE: 93 BPM | DIASTOLIC BLOOD PRESSURE: 81 MMHG | SYSTOLIC BLOOD PRESSURE: 145 MMHG

## 2024-09-19 DIAGNOSIS — S42.202A CLOSED FRACTURE OF PROXIMAL END OF LEFT HUMERUS, UNSPECIFIED FRACTURE MORPHOLOGY, INITIAL ENCOUNTER: ICD-10-CM

## 2024-09-19 DIAGNOSIS — S42.302A CLOSED FRACTURE OF SHAFT OF LEFT HUMERUS, UNSPECIFIED FRACTURE MORPHOLOGY, INITIAL ENCOUNTER: ICD-10-CM

## 2024-09-19 PROCEDURE — 27201423 OPTIME MED/SURG SUP & DEVICES STERILE SUPPLY: Performed by: ORTHOPAEDIC SURGERY

## 2024-09-19 PROCEDURE — 36000711: Performed by: ORTHOPAEDIC SURGERY

## 2024-09-19 PROCEDURE — C1769 GUIDE WIRE: HCPCS | Performed by: ORTHOPAEDIC SURGERY

## 2024-09-19 PROCEDURE — 24515 OPTX HUMRL SHFT FX PLATE/SCR: CPT | Mod: LT,,, | Performed by: ORTHOPAEDIC SURGERY

## 2024-09-19 PROCEDURE — 71000015 HC POSTOP RECOV 1ST HR: Performed by: ORTHOPAEDIC SURGERY

## 2024-09-19 PROCEDURE — 71000016 HC POSTOP RECOV ADDL HR: Performed by: ORTHOPAEDIC SURGERY

## 2024-09-19 PROCEDURE — 37000009 HC ANESTHESIA EA ADD 15 MINS: Performed by: ORTHOPAEDIC SURGERY

## 2024-09-19 PROCEDURE — 37000008 HC ANESTHESIA 1ST 15 MINUTES: Performed by: ORTHOPAEDIC SURGERY

## 2024-09-19 PROCEDURE — C1713 ANCHOR/SCREW BN/BN,TIS/BN: HCPCS | Performed by: ORTHOPAEDIC SURGERY

## 2024-09-19 PROCEDURE — 63600175 PHARM REV CODE 636 W HCPCS: Performed by: ORTHOPAEDIC SURGERY

## 2024-09-19 PROCEDURE — 63600175 PHARM REV CODE 636 W HCPCS: Performed by: NURSE ANESTHETIST, CERTIFIED REGISTERED

## 2024-09-19 PROCEDURE — 64415 NJX AA&/STRD BRCH PLXS IMG: CPT | Performed by: ANESTHESIOLOGY

## 2024-09-19 PROCEDURE — 25000003 PHARM REV CODE 250: Performed by: ORTHOPAEDIC SURGERY

## 2024-09-19 PROCEDURE — 71000033 HC RECOVERY, INTIAL HOUR: Performed by: ORTHOPAEDIC SURGERY

## 2024-09-19 PROCEDURE — 63600175 PHARM REV CODE 636 W HCPCS: Mod: JZ,JG | Performed by: ANESTHESIOLOGY

## 2024-09-19 PROCEDURE — 36000710: Performed by: ORTHOPAEDIC SURGERY

## 2024-09-19 PROCEDURE — 25000003 PHARM REV CODE 250: Performed by: NURSE ANESTHETIST, CERTIFIED REGISTERED

## 2024-09-19 PROCEDURE — 25000003 PHARM REV CODE 250

## 2024-09-19 DEVICE — SCREW BONE CORTICAL 3.5X22MM T: Type: IMPLANTABLE DEVICE | Site: ARM | Status: FUNCTIONAL

## 2024-09-19 DEVICE — SCREW BONE CORTICAL 3.5X24MM T: Type: IMPLANTABLE DEVICE | Site: ARM | Status: FUNCTIONAL

## 2024-09-19 DEVICE — SCREW AXSOS CORTEX TI 3.5X18MM: Type: IMPLANTABLE DEVICE | Site: ARM | Status: FUNCTIONAL

## 2024-09-19 DEVICE — IMPLANTABLE DEVICE: Type: IMPLANTABLE DEVICE | Site: ARM | Status: FUNCTIONAL

## 2024-09-19 RX ORDER — BUPIVACAINE HYDROCHLORIDE 2.5 MG/ML
INJECTION, SOLUTION EPIDURAL; INFILTRATION; INTRACAUDAL
Status: DISCONTINUED | OUTPATIENT
Start: 2024-09-19 | End: 2024-09-19

## 2024-09-19 RX ORDER — SODIUM CHLORIDE 0.9 % (FLUSH) 0.9 %
10 SYRINGE (ML) INJECTION
Status: DISCONTINUED | OUTPATIENT
Start: 2024-09-19 | End: 2024-09-19 | Stop reason: HOSPADM

## 2024-09-19 RX ORDER — FENTANYL CITRATE 50 UG/ML
INJECTION, SOLUTION INTRAMUSCULAR; INTRAVENOUS
Status: DISCONTINUED | OUTPATIENT
Start: 2024-09-19 | End: 2024-09-19

## 2024-09-19 RX ORDER — SODIUM CHLORIDE 9 MG/ML
INJECTION, SOLUTION INTRAVENOUS CONTINUOUS
Status: DISCONTINUED | OUTPATIENT
Start: 2024-09-19 | End: 2024-09-19 | Stop reason: HOSPADM

## 2024-09-19 RX ORDER — CEPHALEXIN 500 MG/1
500 CAPSULE ORAL EVERY 6 HOURS
Qty: 4 CAPSULE | Refills: 0 | Status: SHIPPED | OUTPATIENT
Start: 2024-09-19 | End: 2024-09-20

## 2024-09-19 RX ORDER — ASPIRIN 81 MG/1
81 TABLET ORAL 2 TIMES DAILY
Qty: 60 TABLET | Refills: 0 | Status: SHIPPED | OUTPATIENT
Start: 2024-09-19 | End: 2024-10-19

## 2024-09-19 RX ORDER — ONDANSETRON 4 MG/1
4 TABLET, FILM COATED ORAL 2 TIMES DAILY
Qty: 10 TABLET | Refills: 0 | Status: SHIPPED | OUTPATIENT
Start: 2024-09-19

## 2024-09-19 RX ORDER — PHENYLEPHRINE HYDROCHLORIDE 10 MG/ML
INJECTION INTRAVENOUS
Status: DISCONTINUED | OUTPATIENT
Start: 2024-09-19 | End: 2024-09-19

## 2024-09-19 RX ORDER — BUPIVACAINE HYDROCHLORIDE 2.5 MG/ML
INJECTION, SOLUTION EPIDURAL; INFILTRATION; INTRACAUDAL
Status: COMPLETED
Start: 2024-09-19 | End: 2024-09-19

## 2024-09-19 RX ORDER — PROPOFOL 10 MG/ML
VIAL (ML) INTRAVENOUS
Status: DISCONTINUED | OUTPATIENT
Start: 2024-09-19 | End: 2024-09-19

## 2024-09-19 RX ORDER — MIDAZOLAM HYDROCHLORIDE 1 MG/ML
INJECTION INTRAMUSCULAR; INTRAVENOUS
Status: DISCONTINUED | OUTPATIENT
Start: 2024-09-19 | End: 2024-09-19

## 2024-09-19 RX ORDER — DEXAMETHASONE SODIUM PHOSPHATE 4 MG/ML
INJECTION, SOLUTION INTRA-ARTICULAR; INTRALESIONAL; INTRAMUSCULAR; INTRAVENOUS; SOFT TISSUE
Status: DISCONTINUED | OUTPATIENT
Start: 2024-09-19 | End: 2024-09-19

## 2024-09-19 RX ORDER — ACETAMINOPHEN 10 MG/ML
INJECTION, SOLUTION INTRAVENOUS
Status: DISCONTINUED | OUTPATIENT
Start: 2024-09-19 | End: 2024-09-19

## 2024-09-19 RX ORDER — LIDOCAINE HYDROCHLORIDE 20 MG/ML
INJECTION INTRAVENOUS
Status: DISCONTINUED | OUTPATIENT
Start: 2024-09-19 | End: 2024-09-19

## 2024-09-19 RX ORDER — PROCHLORPERAZINE EDISYLATE 5 MG/ML
5 INJECTION INTRAMUSCULAR; INTRAVENOUS EVERY 30 MIN PRN
Status: DISCONTINUED | OUTPATIENT
Start: 2024-09-19 | End: 2024-09-19 | Stop reason: HOSPADM

## 2024-09-19 RX ORDER — HYDROMORPHONE HYDROCHLORIDE 2 MG/ML
0.2 INJECTION, SOLUTION INTRAMUSCULAR; INTRAVENOUS; SUBCUTANEOUS EVERY 5 MIN PRN
Status: DISCONTINUED | OUTPATIENT
Start: 2024-09-19 | End: 2024-09-19 | Stop reason: HOSPADM

## 2024-09-19 RX ORDER — HYDROCODONE BITARTRATE AND ACETAMINOPHEN 5; 325 MG/1; MG/1
1 TABLET ORAL EVERY 6 HOURS PRN
Qty: 28 TABLET | Refills: 0 | Status: SHIPPED | OUTPATIENT
Start: 2024-09-19

## 2024-09-19 RX ORDER — ONDANSETRON HYDROCHLORIDE 2 MG/ML
INJECTION, SOLUTION INTRAVENOUS
Status: DISCONTINUED | OUTPATIENT
Start: 2024-09-19 | End: 2024-09-19

## 2024-09-19 RX ORDER — ROCURONIUM BROMIDE 10 MG/ML
INJECTION, SOLUTION INTRAVENOUS
Status: DISCONTINUED | OUTPATIENT
Start: 2024-09-19 | End: 2024-09-19

## 2024-09-19 RX ORDER — OXYCODONE HYDROCHLORIDE 5 MG/1
5 TABLET ORAL
Status: DISCONTINUED | OUTPATIENT
Start: 2024-09-19 | End: 2024-09-19 | Stop reason: HOSPADM

## 2024-09-19 RX ADMIN — ROCURONIUM BROMIDE 10 MG: 10 INJECTION, SOLUTION INTRAVENOUS at 05:09

## 2024-09-19 RX ADMIN — FENTANYL CITRATE 50 MCG: 50 INJECTION INTRAMUSCULAR; INTRAVENOUS at 03:09

## 2024-09-19 RX ADMIN — LIDOCAINE HYDROCHLORIDE 100 MG: 20 INJECTION, SOLUTION INTRAVENOUS at 03:09

## 2024-09-19 RX ADMIN — BUPIVACAINE HYDROCHLORIDE 20 ML: 2.5 INJECTION, SOLUTION EPIDURAL; INFILTRATION; INTRACAUDAL; PERINEURAL at 08:09

## 2024-09-19 RX ADMIN — FENTANYL CITRATE 50 MCG: 50 INJECTION INTRAMUSCULAR; INTRAVENOUS at 06:09

## 2024-09-19 RX ADMIN — SODIUM CHLORIDE, SODIUM LACTATE, POTASSIUM CHLORIDE, AND CALCIUM CHLORIDE: .6; .31; .03; .02 INJECTION, SOLUTION INTRAVENOUS at 03:09

## 2024-09-19 RX ADMIN — SUGAMMADEX 200 MG: 100 INJECTION, SOLUTION INTRAVENOUS at 06:09

## 2024-09-19 RX ADMIN — FENTANYL CITRATE 50 MCG: 50 INJECTION INTRAMUSCULAR; INTRAVENOUS at 04:09

## 2024-09-19 RX ADMIN — ONDANSETRON 8 MG: 2 INJECTION, SOLUTION INTRAMUSCULAR; INTRAVENOUS at 04:09

## 2024-09-19 RX ADMIN — ROCURONIUM BROMIDE 10 MG: 10 INJECTION, SOLUTION INTRAVENOUS at 06:09

## 2024-09-19 RX ADMIN — ACETAMINOPHEN 1000 MG: 10 INJECTION, SOLUTION INTRAVENOUS at 05:09

## 2024-09-19 RX ADMIN — PHENYLEPHRINE HYDROCHLORIDE 200 MCG: 10 INJECTION INTRAVENOUS at 04:09

## 2024-09-19 RX ADMIN — ROCURONIUM BROMIDE 50 MG: 10 INJECTION, SOLUTION INTRAVENOUS at 03:09

## 2024-09-19 RX ADMIN — DEXAMETHASONE SODIUM PHOSPHATE 8 MG: 4 INJECTION, SOLUTION INTRA-ARTICULAR; INTRALESIONAL; INTRAMUSCULAR; INTRAVENOUS; SOFT TISSUE at 04:09

## 2024-09-19 RX ADMIN — MIDAZOLAM HYDROCHLORIDE 2 MG: 1 INJECTION, SOLUTION INTRAMUSCULAR; INTRAVENOUS at 03:09

## 2024-09-19 RX ADMIN — PROPOFOL 20 MG: 10 INJECTION, EMULSION INTRAVENOUS at 03:09

## 2024-09-19 RX ADMIN — PROPOFOL 150 MG: 10 INJECTION, EMULSION INTRAVENOUS at 03:09

## 2024-09-19 RX ADMIN — PHENYLEPHRINE HYDROCHLORIDE 0.2 MCG/KG/MIN: 10 INJECTION INTRAVENOUS at 04:09

## 2024-09-19 RX ADMIN — PHENYLEPHRINE HYDROCHLORIDE 200 MCG: 10 INJECTION INTRAVENOUS at 05:09

## 2024-09-19 RX ADMIN — SODIUM CHLORIDE, SODIUM LACTATE, POTASSIUM CHLORIDE, AND CALCIUM CHLORIDE: .6; .31; .03; .02 INJECTION, SOLUTION INTRAVENOUS at 05:09

## 2024-09-19 RX ADMIN — ROCURONIUM BROMIDE 20 MG: 10 INJECTION, SOLUTION INTRAVENOUS at 04:09

## 2024-09-19 RX ADMIN — CEFAZOLIN 2 G: 2 INJECTION, POWDER, FOR SOLUTION INTRAMUSCULAR; INTRAVENOUS at 04:09

## 2024-09-20 ENCOUNTER — TELEPHONE (OUTPATIENT)
Dept: ORTHOPEDICS | Facility: CLINIC | Age: 59
End: 2024-09-20
Payer: COMMERCIAL

## 2024-09-20 DIAGNOSIS — S42.202A CLOSED FRACTURE OF PROXIMAL END OF LEFT HUMERUS, UNSPECIFIED FRACTURE MORPHOLOGY, INITIAL ENCOUNTER: Primary | ICD-10-CM

## 2024-09-20 NOTE — BRIEF OP NOTE
Orthopedic Surgery Brief Op Discharge    Procedure: ORIF Left humerus    Pre-op diagnosis:left humeral shaft fracture     Postop diagnosis: same    Surgeon: Neeraj Mai MD    Residents:  MD Nereida Ontiveros MD    Blood loss: 400cc    Fluids: see anesthesia documentation    Anesthesia: General anesthesia    Complications: none    Hospital Course  Patient presented to [facility] on day of scheduled surgery and underwent ORIF left humerus, please see operative report for further detail. Patient did well postoperatively and was found to be fit for discharge on POD# 0.  She received a post-operative nerve block after post-op neurovascular exam of LUE was performed. Their pain was controlled.  The patient met all discharge criteria.  The patient was discharged home in stable condition. All questions and concerns of the patient were answered to their satisfaction.    Condition: Patient tolerated procedure well, was extubated, and returned to the recovery unit in stable condition.     Post Op Exam  Motor intact AIN/PIN/U/Radial/musculocutaneous. Axillary; intact wrist extension/flexion, elbow flexion/extension, fires deltoid      Please call our team with questions or concerns    Nereida Durbin MD PGY-3  [employer] Orthopedic Surgery    I have reviewed the notes, assessments, and/or procedures performed by Dr. Durbin, I concur with her/his documentation of Yasmine Villa.

## 2024-09-20 NOTE — PLAN OF CARE
Patient oob and dressed, vss, nerve block done and working well, no pain, all instructions given, discharged in WC to car

## 2024-09-20 NOTE — TRANSFER OF CARE
"Anesthesia Transfer of Care Note    Patient: Yasmine Villa    Procedure(s) Performed: Procedure(s) (LRB):  ORIF, FRACTURE, HUMERUS (Left)    Patient location: PACU    Anesthesia Type: general    Transport from OR: Transported from OR on 100% O2 by closed face mask with adequate spontaneous ventilation    Post pain: adequate analgesia    Post assessment: no apparent anesthetic complications and tolerated procedure well    Post vital signs: stable    Level of consciousness: sedated and responds to stimulation    Nausea/Vomiting: no nausea/vomiting    Complications: none    Transfer of care protocol was followed    Last vitals: Visit Vitals  /87 (BP Location: Left arm, Patient Position: Lying)   Pulse 96   Temp 37.2 °C (99 °F) (Skin)   Resp 17   Ht 5' 4" (1.626 m)   Wt 87.5 kg (192 lb 14.4 oz)   LMP 10/01/2021 (Exact Date)   SpO2 96%   Breastfeeding No   BMI 33.11 kg/m²     "

## 2024-09-20 NOTE — TELEPHONE ENCOUNTER
Called back pt and left message saying that if they needed a doctor's note for work that they can get that note at their 2 week post up appt.

## 2024-09-20 NOTE — TELEPHONE ENCOUNTER
----- Message from [place] sent at 9/20/2024 10:55 AM CDT -----  Type:  Needs Medical Advice/doctors note     Who Called: daughter     Would the patient rather a call back or a response via [facility]? Call   Best Call Back Number: [phone]  Additional Information: daughter requesting a call back to discuss receiving a doctor note for two weeks from now for pt

## 2024-09-20 NOTE — ANESTHESIA POSTPROCEDURE EVALUATION
Anesthesia Post Evaluation    Patient: Yasmine Villa    Procedure(s) Performed: Procedure(s) (LRB):  ORIF, FRACTURE, HUMERUS (Left)    Final Anesthesia Type: general      Patient location during evaluation: PACU  Patient participation: Yes- Able to Participate  Level of consciousness: awake and alert  Post-procedure vital signs: reviewed and stable  Pain management: adequate  Airway patency: patent    PONV status at discharge: No PONV  Anesthetic complications: no      Cardiovascular status: blood pressure returned to baseline  Respiratory status: unassisted  Hydration status: euvolemic                Vitals Value Taken Time   /81 09/19/24 2030   Temp 37.1 °C (98.7 °F) 09/19/24 1950   Pulse 93 09/19/24 2030   Resp 17 09/19/24 2030   SpO2 98 % 09/19/24 2030         Event Time   Out of Recovery 20:21:05         Pain/Lady Score: Lady Score: 10 (9/19/2024  8:56 PM)

## 2024-09-20 NOTE — TELEPHONE ENCOUNTER
Called pt back about inquiry for a doctor's note. Informed pt that they can get a note at their next follow up appt.

## 2024-09-20 NOTE — ANESTHESIA PROCEDURE NOTES
Peripheral Block    Patient location during procedure: post-op   Block not for primary anesthetic.  Reason for block: at surgeon's request and post-op pain management   Post-op Pain Location: Left [place]   Start time: 9/19/2024 8:03 PM  Timeout: 9/19/2024 8:02 PM   End time: 9/19/2024 8:07 PM    Staffing  Authorizing Provider: Reinier Retana MD  Performing Provider: Reinier Retana MD    Staffing  Performed by: Reinier Retana MD  Authorized by: Reinier Retana MD    Preanesthetic Checklist  Completed: patient identified, IV checked, site marked, risks and benefits discussed, surgical consent, monitors and equipment checked, pre-op evaluation and timeout performed  Peripheral Block  Patient position: supine  Prep: ChloraPrep  Patient monitoring: heart rate, cardiac monitor, continuous pulse ox, continuous capnometry and frequent blood pressure checks  Block type: supraclavicular  Laterality: left  Injection technique: single shot  Needle  Needle type: Stimuplex   Needle gauge: 22 G  Needle length: 2 in  Needle localization: anatomical landmarks and ultrasound guidance   -ultrasound image captured on disc.  Assessment  Injection assessment: negative aspiration, negative parasthesia and local visualized surrounding nerve  Paresthesia pain: none  Heart rate change: no  Slow fractionated injection: yes    Medications:    Medications: bupivacaine (pf) (MARCAINE) injection 0.25% - Perineural   20 mL - 9/19/2024 8:07:00 PM    Additional Notes  VSS.  [facility] RN monitoring vitals throughout procedure.  Patient tolerated procedure well.

## 2024-09-21 PROBLEM — S42.302A CLOSED FRACTURE OF SHAFT OF LEFT HUMERUS: Status: ACTIVE | Noted: 2024-09-21

## 2024-09-21 NOTE — OP NOTE
Operative Note     Date of Service: 09/19/2024     Pre-Operative Diagnosis:   Left closed displaced humerus shaft fracture    Post-Operative Diagnosis:    Left closed displaced humerus shaft fracture    Procedure(s):   1. Open reduction internal fixation left humerus shaft    Anesthesia: General     Surgeon:  Neeraj Mai MD, was present and scrubbed for the key portions of the procedure.     Assistant(s):   MD Nereida Ontiveros MD    Indications   Patient is a 58 y.o.female who sustained a closed left humerus fracture after a ground level fall. The patient was checked again in the Holding Room. The risks, benefits, complications, treatment options, and expected outcomes were reviewed again with the patient. The patient has elected to proceed with open reduction and internal fixation left humerus. The risks and potential complications include but are not limited to infection, nerve injury, vascular injury, persistent pain, potential skin necrosis, deep vein thrombosis, possible pulmonary embolus, complications of the anesthetics and failure of the implants with potential need for future surgery to remove or revise. The patient concurred with the proposed plan, giving informed consent. The site of surgery was identified by the patient and properly noted/marked by me.     Implant:   1. 3.5 mm anatomic proximal humerus plate Gita Pangea  2. 4 x 3.5 mm lag screws     Procedure Details:   The patient was taken to operative suite. A Time-Out was held and the patient, location and procedure of open reduction internal fixation left humerus were verified and agreed upon by all members of the operating room staff. Prophylacic antibiotics were given.The patient was given general anesthesia. Following the successful induction of anesthesia the patient was placed supine on the beach chair.  All bony prominences were well padded.  Pillow was inserted under the legs and the head was tightly secured to the head  rest.  The beach chair was brought up to 30° and provisional x-rays were taken to confirm adequate positioning. The left upper extremity was prepped and draped in normal sterile manner.     A extensile deltopectoral incision was made just lateral to the coracoid process extending distally past the axillary fold along the lateral edge of the biceps, extending down to the subcutaneous tissue. Bleeders were identified, isolated, and cauterized.  Soft tissue flaps were created with scissors dissection and cephalic vein was identified and mobilized laterally.  Electrocautery was used to mobilize the cephalic vein taking care to cauterize bleeders proximally and distally.  Bleeding was observed along the distal portion of the cephalic vein that was not able to be controlled with electrocautery so the vein was tied off at this level using silk suture and hemostasis was obtained.  We then proceeded to develop the deltopectoral interval bluntly and clavipectoral fascia was incised 1 cm lateral to the conjoined tendon in line with the incision.  Subdeltoid space was bluntly dissected with a periosteal elevator and a brown retractor was inserted.  A Jon retractor was used to retract the conjoined tendon gently.  Subdeltoid bursa was removed with electrocautery and Long head of the biceps tendon was identified.  We developed periosteal flap just lateral to the bicipital groove.  The anterior portion of the deltoid insertion was released for appropriate visualization and plate placement.  A small portion of the pect tendon proximally was released as well.  Periosteal flap was developed distally until fracture site was identified.  Brachialis was split off the distal segment for appropriate visualization.  Careful retraction with Homans was performed distally to protect neurovascular structures.  Radial nerve was not identified in our surgical field.  There was a large butterfly fragment displaced medially.  This was  mobilized with careful blunt dissection.  Fracture hematoma was debrided and irrigated until fracture edges were clean.  The butterfly fragment was then reduced to the proximal segment using serrated and point-to-point clamps.  Once satisfied with our reduction under direct visualization we proceeded with lag screw fixation by technique.  2 x 3.5 mm lag screws of appropriate depth were inserted with excellent purchase.  We then reduced the distal segment to the proximal segment and inserted 2 lag screws from the distal segment into the butterfly fragment using similar technique with excellent purchase.  Appropriate reduction of the fracture with good alignment in length was confirmed under fluoroscopic guidance in multiple planes.    A 3.5 mm anatomic proximal humerus Gita Pangea plate was selected out the back table and fixed to the bone provisionally with K-wires.  Fluoroscopy was used to confirm appropriate plate placement in AP and lateral planes.  A distal cortical screw was inserted to appropriate depth with excellent purchase 2 locking our position.  We then proceeded to drill for our proximal locking cluster using the locking screw guides.  Care was taken not to penetrate the articular surface and locking screws of appropriate length were inserted.  Three more cortical screws were inserted with excellent purchase for a total of 8 cortices distally.  This completed our construct.  Final images were taken confirming appropriate hardware placement and length, fracture reduction with good alignment.    Wound was copiously washed with normal saline.  Hemostasis was obtained.  Closure of the deltopectoral and anterior humerus interval was performed with 0 Vicryl suture.  2-0 Vicryl suture was used for subcutaneous closure.  3-0 running Monocryl suture was used for the subcuticular layer followed by Dermabond and application of sterile dressings.  Arm was placed in a simple sling.    Instrument, sponge, and  needle counts were correct prior to wound closure and at the conclusion of the case. The patient was awoken from anesthesia, tolerated the procedure well and taken to recovery in stable condition.     Findings:  As above   Estimated blood loss:  400 cc  Drains: none  Total IV fluids: Per anesthesia records   Specimens: none   Complications: None, pt tolerated the procedure well   Disposition: Awakened from anesthesia, and taken to the recovery room in a stable condition, having suffered no apparent untoward event   Condition: Stable     Post-Operative Management   Nonweightbearing left upper extremity  Elevation/ice  Patient was neurovascularly intact upon evaluation postoperatively and block was administered by anesthesia  Clinic f/u 2 weeks for wound check   Pain scripts in chart    Discharge Medications:   Pain medication     Jorge Craft MD  [facility]     I have reviewed the notes, assessments, and/or procedures performed by Dr. Craft, I concur with her/his documentation of Yasmine Villa.

## 2024-09-23 ENCOUNTER — TELEPHONE (OUTPATIENT)
Dept: ORTHOPEDICS | Facility: CLINIC | Age: 59
End: 2024-09-23
Payer: COMMERCIAL

## 2024-09-23 NOTE — TELEPHONE ENCOUNTER
"Spoke with patient. Stated she took two of the Keflex prescribed after surgery and broke out in "pretty bad hives".  She took benadryl and they went away.  Would like to know if  want's to prescribe something else.  Added to allergy list.    " 4

## 2024-09-23 NOTE — TELEPHONE ENCOUNTER
----- Message from Svetlana Srinivasan sent at 9/23/2024 10:47 AM CDT -----  Type:  Needs Medical Advice    Who Called: pt  Would the patient rather a call back or a response via [facility]? call  Best Call Back Number:  [phone]  Additional Information: pt would lie to speak regarding being allergic to antibiotics prescribed after surgery

## 2024-10-02 ENCOUNTER — OFFICE VISIT (OUTPATIENT)
Dept: ORTHOPEDICS | Facility: CLINIC | Age: 59
End: 2024-10-02
Payer: COMMERCIAL

## 2024-10-02 ENCOUNTER — HOSPITAL ENCOUNTER (OUTPATIENT)
Dept: RADIOLOGY | Facility: HOSPITAL | Age: 59
Discharge: HOME | End: 2024-10-02
Attending: ORTHOPAEDIC SURGERY
Payer: COMMERCIAL

## 2024-10-02 VITALS — HEIGHT: 64 IN | BODY MASS INDEX: 32.41 KG/M2 | WEIGHT: 189.81 LBS

## 2024-10-02 DIAGNOSIS — S42.302D CLOSED FRACTURE OF SHAFT OF LEFT HUMERUS WITH ROUTINE HEALING, UNSPECIFIED FRACTURE MORPHOLOGY, SUBSEQUENT ENCOUNTER: Primary | ICD-10-CM

## 2024-10-02 DIAGNOSIS — S42.202A CLOSED FRACTURE OF PROXIMAL END OF LEFT HUMERUS, UNSPECIFIED FRACTURE MORPHOLOGY, INITIAL ENCOUNTER: Primary | ICD-10-CM

## 2024-10-02 DIAGNOSIS — S42.202A CLOSED FRACTURE OF PROXIMAL END OF LEFT HUMERUS, UNSPECIFIED FRACTURE MORPHOLOGY, INITIAL ENCOUNTER: ICD-10-CM

## 2024-10-02 PROCEDURE — 99024 POSTOP FOLLOW-UP VISIT: CPT | Mod: S$GLB,,, | Performed by: ORTHOPAEDIC SURGERY

## 2024-10-02 PROCEDURE — 99999 PR PBB SHADOW E&M-EST. PATIENT-LVL IV: CPT | Mod: PBBFAC,,, | Performed by: ORTHOPAEDIC SURGERY

## 2024-10-02 PROCEDURE — 1159F MED LIST DOCD IN RCRD: CPT | Mod: CPTII,S$GLB,, | Performed by: ORTHOPAEDIC SURGERY

## 2024-10-02 PROCEDURE — 1160F RVW MEDS BY RX/DR IN RCRD: CPT | Mod: CPTII,S$GLB,, | Performed by: ORTHOPAEDIC SURGERY

## 2024-10-02 PROCEDURE — 73060 X-RAY EXAM OF HUMERUS: CPT | Mod: TC,FY,LT

## 2024-10-02 PROCEDURE — 73060 X-RAY EXAM OF HUMERUS: CPT | Mod: 26,LT,, | Performed by: RADIOLOGY

## 2024-10-02 NOTE — PROGRESS NOTES
Patient ID:   Yasmine Villa is a 58 y.o. female.    Chief Complaint:   Surgical aftercare s/p ORIF left humerus fracture    HPI:   The patient is returning today for her 1st postoperative visit.  She is doing very well.  She feels a lot better since we stabilized her left humerus fracture.  She is taking Tylenol for pain.    Medications:    Current Outpatient Medications:     aspirin (ECOTRIN) 81 MG EC tablet, Take 1 tablet (81 mg total) by mouth 2 (two) times a day., Disp: 60 tablet, Rfl: 0    azelastine (ASTELIN) 137 mcg (0.1 %) nasal spray, 1 spray (137 mcg total) by Nasal route 2 (two) times daily., Disp: 30 mL, Rfl: 0    cetirizine (ZYRTEC) 10 MG tablet, Take 1 tablet (10 mg total) by mouth once daily., Disp: 30 tablet, Rfl: 1    cromolyn (NASALCHROM) 5.2 mg/spray (4 %) nasal spray, 1 spray by Nasal route 4 (four) times daily., Disp: 26 mL, Rfl: 1    fluticasone propionate (FLONASE) 50 mcg/actuation nasal spray, 1 spray (50 mcg total) by Each Nostril route once daily., Disp: 16 g, Rfl: 1    fluticasone propionate (FLONASE) 50 mcg/actuation nasal spray, 1 spray (50 mcg total) by Each Nostril route once daily., Disp: 16 g, Rfl: 3    HYDROcodone-acetaminophen (NORCO) 5-325 mg per tablet, Take 1 tablet by mouth every 6 (six) hours as needed for Pain., Disp: 28 tablet, Rfl: 0    ondansetron (ZOFRAN) 4 MG tablet, Take 1 tablet (4 mg total) by mouth 2 (two) times daily., Disp: 10 tablet, Rfl: 0    oxyCODONE-acetaminophen (PERCOCET) 5-325 mg per tablet, Take 1 tablet by mouth every 6 (six) hours as needed for Pain., Disp: 15 tablet, Rfl: 0    predniSONE (DELTASONE) 10 MG tablet, Take 1 tablet (10 mg total) by mouth once daily., Disp: 4 tablet, Rfl: 0    sodium chloride (SALINE NASAL) 0.65 % nasal spray, 2 sprays by Nasal route as needed for Congestion., Disp: 60 mL, Rfl: 12    sulfamethoxazole-trimethoprim 800-160mg (BACTRIM DS) 800-160 mg Tab, Take 1 tablet by mouth 2 (two) times daily., Disp: 10 tablet, Rfl: 0     "sumatriptan (IMITREX) 100 MG tablet, Take 1 tablet (100 mg total) by mouth every 2 (two) hours as needed for Migraine (can take 2nd tablet 2 hours later (not to exceed 2 tablets in 24 hours))., Disp: 9 tablet, Rfl: 5    temazepam (RESTORIL) 30 mg capsule, Take 1 capsule (30 mg total) by mouth nightly., Disp: 30 capsule, Rfl: 5    traZODone (DESYREL) 50 MG tablet, Take 1 tablet (50 mg total) by mouth every evening., Disp: 30 tablet, Rfl: 11    ALPRAZolam (XANAX) 0.25 MG tablet, Take 1 tablet (0.25 mg total) by mouth daily as needed for Anxiety., Disp: 30 tablet, Rfl: 1    Allergies:  Review of patient's allergies indicates:   Allergen Reactions    Keflex [cephalexin] Hives    Pcn [penicillins]     Wellbutrin [bupropion hcl]      Hair loss       Vitals:  Ht 5' 4" (1.626 m)   Wt 86.1 kg (189 lb 13.1 oz)   LMP 10/01/2021 (Exact Date)   BMI 32.58 kg/m²     Physical Examination:  Ortho Exam   Left upper extremity exam:  Surgical incision is clean, dry, intact.  No cellulitis.    Elbow range of motion reveals -10 degrees of extension and flexion 130°.    Imaging studies:   I have ordered and independently reviewed the following imaging studies performed at [facility] today    Left humerus x-rays demonstrates satisfactory fracture reduction and hardware position.  I do not appreciate any change from the immediate postoperative x-rays.    Assessment:  1. Closed fracture of shaft of left humerus with routine healing, unspecified fracture morphology, subsequent encounter      Plan:  The patient was instructed to avoid any weight-bearing on the left upper extremity.  In addition she was instructed in performing gentle passive and active range of motion exercises of the left shoulder and the left elbow.  She will avoid any strenuous rotational movements about the left humerus.  She will follow up in 1 month with a new x-ray of the left humerus.       No follow-ups on file.          "

## 2024-10-02 NOTE — LETTER
October 2, 2024      [facility]  [address]  [address]  [place] [address]  Phone: [phone]  Fax: [phone]       Patient: Yasmine Villa   YOB: 1965  Date of Visit: 10/02/2024    To Whom It May Concern:    Yobani Villa  was at [facility] on 10/02/2024. Due to her recent injury to the left arm, please excuse her from 9/18/24 to 10/7/24. She is being cleared to return to work on 10/7/24 with light duty restrictions. If you have any questions or concerns, or if I can be of further assistance, please do not hesitate to contact me.    Sincerely,    Neeraj Mai MD, FAAOS    Residency   [facility] Department of Orthopedic Surgery  Assistant Orthopedic Surgeon, [employer]  Head Team Physician, [facility] [employer]  [place], [place]

## 2024-11-05 ENCOUNTER — HOSPITAL ENCOUNTER (OUTPATIENT)
Dept: RADIOLOGY | Facility: HOSPITAL | Age: 59
Discharge: HOME | End: 2024-11-05
Attending: ORTHOPAEDIC SURGERY
Payer: COMMERCIAL

## 2024-11-05 ENCOUNTER — OFFICE VISIT (OUTPATIENT)
Dept: ORTHOPEDICS | Facility: CLINIC | Age: 59
End: 2024-11-05
Payer: COMMERCIAL

## 2024-11-05 DIAGNOSIS — S42.302D CLOSED FRACTURE OF SHAFT OF LEFT HUMERUS WITH ROUTINE HEALING, UNSPECIFIED FRACTURE MORPHOLOGY, SUBSEQUENT ENCOUNTER: Primary | ICD-10-CM

## 2024-11-05 DIAGNOSIS — S42.202A CLOSED FRACTURE OF PROXIMAL END OF LEFT HUMERUS, UNSPECIFIED FRACTURE MORPHOLOGY, INITIAL ENCOUNTER: ICD-10-CM

## 2024-11-05 PROCEDURE — 99999 PR PBB SHADOW E&M-EST. PATIENT-LVL III: CPT | Mod: PBBFAC,,, | Performed by: ORTHOPAEDIC SURGERY

## 2024-11-05 PROCEDURE — 99024 POSTOP FOLLOW-UP VISIT: CPT | Mod: S$GLB,,, | Performed by: ORTHOPAEDIC SURGERY

## 2024-11-05 PROCEDURE — 73060 X-RAY EXAM OF HUMERUS: CPT | Mod: 26,LT,, | Performed by: RADIOLOGY

## 2024-11-05 PROCEDURE — 73060 X-RAY EXAM OF HUMERUS: CPT | Mod: TC,PN,LT

## 2024-11-05 PROCEDURE — 1160F RVW MEDS BY RX/DR IN RCRD: CPT | Mod: CPTII,S$GLB,, | Performed by: ORTHOPAEDIC SURGERY

## 2024-11-05 PROCEDURE — 1159F MED LIST DOCD IN RCRD: CPT | Mod: CPTII,S$GLB,, | Performed by: ORTHOPAEDIC SURGERY

## 2024-11-05 NOTE — PROGRESS NOTES
Patient ID:   Yasmine Villa is a 58 y.o. female.    Chief Complaint:   6w s/p ORIF left humeral shaft fracture    HPI:   The patient is now 6 weeks out from her surgery.  She reports minimal pain in the left arm.  She does report some difficulty lifting the left arm.    Medications:    Current Outpatient Medications:     ALPRAZolam (XANAX) 0.25 MG tablet, Take 1 tablet (0.25 mg total) by mouth daily as needed for Anxiety., Disp: 30 tablet, Rfl: 1    aspirin (ECOTRIN) 81 MG EC tablet, Take 1 tablet (81 mg total) by mouth 2 (two) times a day., Disp: 60 tablet, Rfl: 0    azelastine (ASTELIN) 137 mcg (0.1 %) nasal spray, 1 spray (137 mcg total) by Nasal route 2 (two) times daily., Disp: 30 mL, Rfl: 0    cetirizine (ZYRTEC) 10 MG tablet, Take 1 tablet (10 mg total) by mouth once daily., Disp: 30 tablet, Rfl: 1    cromolyn (NASALCHROM) 5.2 mg/spray (4 %) nasal spray, 1 spray by Nasal route 4 (four) times daily., Disp: 26 mL, Rfl: 1    fluticasone propionate (FLONASE) 50 mcg/actuation nasal spray, 1 spray (50 mcg total) by Each Nostril route once daily., Disp: 16 g, Rfl: 1    fluticasone propionate (FLONASE) 50 mcg/actuation nasal spray, 1 spray (50 mcg total) by Each Nostril route once daily., Disp: 16 g, Rfl: 3    HYDROcodone-acetaminophen (NORCO) 5-325 mg per tablet, Take 1 tablet by mouth every 6 (six) hours as needed for Pain., Disp: 28 tablet, Rfl: 0    ondansetron (ZOFRAN) 4 MG tablet, Take 1 tablet (4 mg total) by mouth 2 (two) times daily., Disp: 10 tablet, Rfl: 0    oxyCODONE-acetaminophen (PERCOCET) 5-325 mg per tablet, Take 1 tablet by mouth every 6 (six) hours as needed for Pain., Disp: 15 tablet, Rfl: 0    predniSONE (DELTASONE) 10 MG tablet, Take 1 tablet (10 mg total) by mouth once daily., Disp: 4 tablet, Rfl: 0    sodium chloride (SALINE NASAL) 0.65 % nasal spray, 2 sprays by Nasal route as needed for Congestion., Disp: 60 mL, Rfl: 12    sulfamethoxazole-trimethoprim 800-160mg (BACTRIM DS) 800-160 mg Tab, Take  1 tablet by mouth 2 (two) times daily., Disp: 10 tablet, Rfl: 0    sumatriptan (IMITREX) 100 MG tablet, Take 1 tablet (100 mg total) by mouth every 2 (two) hours as needed for Migraine (can take 2nd tablet 2 hours later (not to exceed 2 tablets in 24 hours))., Disp: 9 tablet, Rfl: 5    temazepam (RESTORIL) 30 mg capsule, Take 1 capsule (30 mg total) by mouth nightly., Disp: 30 capsule, Rfl: 5    traZODone (DESYREL) 50 MG tablet, Take 1 tablet (50 mg total) by mouth every evening., Disp: 30 tablet, Rfl: 11    Allergies:  Review of patient's allergies indicates:   Allergen Reactions    Keflex [cephalexin] Hives    Pcn [penicillins]     Wellbutrin [bupropion hcl]      Hair loss       Vitals:  LMP 10/01/2021 (Exact Date)     Physical Examination:  Ortho Exam   Left upper extremity exam:  Range motion of the left shoulder reveals full passive elevation.  She has full extension and flexion of the elbow.    Imaging Studies:  I have ordered and independently reviewed the following imaging studies performed at [facility] today    X-Ray Humerus 2 View Left  Narrative: EXAMINATION:  XR HUMERUS 2 VIEW LEFT    CLINICAL HISTORY:  Unspecified fracture of upper end of left humerus, initial encounter for closed fracture    TECHNIQUE:  Left humerus radiograph series.    COMPARISON:  10/02/2024    FINDINGS:  Left humeral ORIF hardware projects in similar alignment without evidence for interval loosening or failure.  Bony alignment unchanged.  No new abnormality.  Impression: As above.    Electronically signed by: Bart Webb  Date:    11/05/2024  Time:    09:52    Assessment:  1. Closed fracture of shaft of left humerus with routine healing, unspecified fracture morphology, subsequent encounter      Plan:  The patient will start physical therapy to work on range motion of her left shoulder.  She will follow up in 6 weeks with a new x-ray of the left humerus.       No follow-ups on file.

## 2024-11-05 NOTE — LETTER
November 5, 2024      [facility]  [address]  [address]  [place] [address]  Phone: [phone]  Fax: [phone]       Patient: Yasmine Villa   YOB: 1965  Date of Visit: 11/05/2024    To Whom It May Concern:    Yobani Villa  was at [facility] on 11/05/2024. The patient may return to work on 11/5/24 with no restrictions. If you have any questions or concerns, or if I can be of further assistance, please do not hesitate to contact me.    Sincerely,    Neeraj Mai MD, FAAOS    Residency   [facility] Department of Orthopedic Surgery  Assistant Orthopedic Surgeon, [employer]  Head Team Physician, [employer]  [place], [place]

## 2024-11-11 ENCOUNTER — HOSPITAL ENCOUNTER (EMERGENCY)
Facility: HOSPITAL | Age: 59
Discharge: HOME | End: 2024-11-11
Attending: EMERGENCY MEDICINE
Payer: COMMERCIAL

## 2024-11-11 ENCOUNTER — TELEPHONE (OUTPATIENT)
Dept: PRIMARY CARE CLINIC | Facility: CLINIC | Age: 59
End: 2024-11-11
Payer: COMMERCIAL

## 2024-11-11 VITALS
WEIGHT: 190 LBS | DIASTOLIC BLOOD PRESSURE: 103 MMHG | RESPIRATION RATE: 20 BRPM | OXYGEN SATURATION: 97 % | BODY MASS INDEX: 32.44 KG/M2 | TEMPERATURE: 98 F | SYSTOLIC BLOOD PRESSURE: 158 MMHG | HEART RATE: 110 BPM | HEIGHT: 64 IN

## 2024-11-11 DIAGNOSIS — G44.89 OTHER HEADACHE SYNDROME: ICD-10-CM

## 2024-11-11 DIAGNOSIS — R03.0 ELEVATED BLOOD PRESSURE READING: Primary | ICD-10-CM

## 2024-11-11 DIAGNOSIS — R06.02 SHORTNESS OF BREATH: ICD-10-CM

## 2024-11-11 LAB
ALBUMIN SERPL BCP-MCNC: 4.1 G/DL (ref 3.5–5.2)
ALP SERPL-CCNC: 104 U/L (ref 40–150)
ALT SERPL W/O P-5'-P-CCNC: 45 U/L (ref 10–44)
ANION GAP SERPL CALC-SCNC: 11 MMOL/L (ref 8–16)
AST SERPL-CCNC: 22 U/L (ref 10–40)
BACTERIA #/AREA URNS HPF: ABNORMAL /HPF
BASOPHILS # BLD AUTO: 0.05 K/UL (ref 0–0.2)
BASOPHILS NFR BLD: 1 % (ref 0–1.9)
BILIRUB SERPL-MCNC: 0.4 MG/DL (ref 0.1–1)
BILIRUB UR QL STRIP: NEGATIVE
BUN SERPL-MCNC: 11 MG/DL (ref 6–20)
CALCIUM SERPL-MCNC: 10 MG/DL (ref 8.7–10.5)
CHLORIDE SERPL-SCNC: 107 MMOL/L (ref 95–110)
CLARITY UR: CLEAR
CO2 SERPL-SCNC: 22 MMOL/L (ref 23–29)
COLOR UR: YELLOW
CREAT SERPL-MCNC: 0.8 MG/DL (ref 0.5–1.4)
D DIMER PPP IA.FEU-MCNC: 0.73 MG/L FEU
DIFFERENTIAL METHOD BLD: NORMAL
EOSINOPHIL # BLD AUTO: 0.1 K/UL (ref 0–0.5)
EOSINOPHIL NFR BLD: 1.4 % (ref 0–8)
ERYTHROCYTE [DISTWIDTH] IN BLOOD BY AUTOMATED COUNT: 11.8 % (ref 11.5–14.5)
EST. GFR  (NO RACE VARIABLE): >60 ML/MIN/1.73 M^2
GLUCOSE SERPL-MCNC: 126 MG/DL (ref 70–110)
GLUCOSE UR QL STRIP: NEGATIVE
HCT VFR BLD AUTO: 44.5 % (ref 37–48.5)
HGB BLD-MCNC: 15.1 G/DL (ref 12–16)
HGB UR QL STRIP: NEGATIVE
IMM GRANULOCYTES # BLD AUTO: 0.01 K/UL (ref 0–0.04)
IMM GRANULOCYTES NFR BLD AUTO: 0.2 % (ref 0–0.5)
KETONES UR QL STRIP: NEGATIVE
LEUKOCYTE ESTERASE UR QL STRIP: ABNORMAL
LYMPHOCYTES # BLD AUTO: 1.3 K/UL (ref 1–4.8)
LYMPHOCYTES NFR BLD: 26.7 % (ref 18–48)
MAGNESIUM SERPL-MCNC: 1.9 MG/DL (ref 1.6–2.6)
MCH RBC QN AUTO: 30 PG (ref 27–31)
MCHC RBC AUTO-ENTMCNC: 33.9 G/DL (ref 32–36)
MCV RBC AUTO: 88 FL (ref 82–98)
MICROSCOPIC COMMENT: ABNORMAL
MONOCYTES # BLD AUTO: 0.4 K/UL (ref 0.3–1)
MONOCYTES NFR BLD: 8.4 % (ref 4–15)
NEUTROPHILS # BLD AUTO: 3.1 K/UL (ref 1.8–7.7)
NEUTROPHILS NFR BLD: 62.3 % (ref 38–73)
NITRITE UR QL STRIP: NEGATIVE
NRBC BLD-RTO: 0 /100 WBC
PH UR STRIP: 7 [PH] (ref 5–8)
PLATELET # BLD AUTO: 227 K/UL (ref 150–450)
PMV BLD AUTO: 10.4 FL (ref 9.2–12.9)
POTASSIUM SERPL-SCNC: 4 MMOL/L (ref 3.5–5.1)
PROT SERPL-MCNC: 7.6 G/DL (ref 6–8.4)
PROT UR QL STRIP: NEGATIVE
RBC # BLD AUTO: 5.04 M/UL (ref 4–5.4)
RBC #/AREA URNS HPF: 1 /HPF (ref 0–4)
SODIUM SERPL-SCNC: 140 MMOL/L (ref 136–145)
SP GR UR STRIP: 1.01 (ref 1–1.03)
SQUAMOUS #/AREA URNS HPF: 1 /HPF
TROPONIN I SERPL DL<=0.01 NG/ML-MCNC: <0.006 NG/ML (ref 0–0.03)
TSH SERPL DL<=0.005 MIU/L-ACNC: 1.72 UIU/ML (ref 0.4–4)
URN SPEC COLLECT METH UR: ABNORMAL
UROBILINOGEN UR STRIP-ACNC: NEGATIVE EU/DL
WBC # BLD AUTO: 4.99 K/UL (ref 3.9–12.7)
WBC #/AREA URNS HPF: 6 /HPF (ref 0–5)

## 2024-11-11 PROCEDURE — 96365 THER/PROPH/DIAG IV INF INIT: CPT

## 2024-11-11 PROCEDURE — 63600175 PHARM REV CODE 636 W HCPCS: Performed by: STUDENT IN AN ORGANIZED HEALTH CARE EDUCATION/TRAINING PROGRAM

## 2024-11-11 PROCEDURE — 83735 ASSAY OF MAGNESIUM: CPT | Performed by: STUDENT IN AN ORGANIZED HEALTH CARE EDUCATION/TRAINING PROGRAM

## 2024-11-11 PROCEDURE — 25000003 PHARM REV CODE 250: Performed by: STUDENT IN AN ORGANIZED HEALTH CARE EDUCATION/TRAINING PROGRAM

## 2024-11-11 PROCEDURE — 96372 THER/PROPH/DIAG INJ SC/IM: CPT | Performed by: STUDENT IN AN ORGANIZED HEALTH CARE EDUCATION/TRAINING PROGRAM

## 2024-11-11 PROCEDURE — 93005 ELECTROCARDIOGRAM TRACING: CPT

## 2024-11-11 PROCEDURE — 96375 TX/PRO/DX INJ NEW DRUG ADDON: CPT

## 2024-11-11 PROCEDURE — 85025 COMPLETE CBC W/AUTO DIFF WBC: CPT | Performed by: STUDENT IN AN ORGANIZED HEALTH CARE EDUCATION/TRAINING PROGRAM

## 2024-11-11 PROCEDURE — 25500020 PHARM REV CODE 255: Performed by: EMERGENCY MEDICINE

## 2024-11-11 PROCEDURE — 84443 ASSAY THYROID STIM HORMONE: CPT | Performed by: EMERGENCY MEDICINE

## 2024-11-11 PROCEDURE — 81000 URINALYSIS NONAUTO W/SCOPE: CPT | Performed by: STUDENT IN AN ORGANIZED HEALTH CARE EDUCATION/TRAINING PROGRAM

## 2024-11-11 PROCEDURE — 80053 COMPREHEN METABOLIC PANEL: CPT | Performed by: STUDENT IN AN ORGANIZED HEALTH CARE EDUCATION/TRAINING PROGRAM

## 2024-11-11 PROCEDURE — 99285 EMERGENCY DEPT VISIT HI MDM: CPT | Mod: 25

## 2024-11-11 PROCEDURE — 85379 FIBRIN DEGRADATION QUANT: CPT | Performed by: STUDENT IN AN ORGANIZED HEALTH CARE EDUCATION/TRAINING PROGRAM

## 2024-11-11 PROCEDURE — 84484 ASSAY OF TROPONIN QUANT: CPT | Performed by: STUDENT IN AN ORGANIZED HEALTH CARE EDUCATION/TRAINING PROGRAM

## 2024-11-11 PROCEDURE — 93010 ELECTROCARDIOGRAM REPORT: CPT | Mod: ,,, | Performed by: INTERNAL MEDICINE

## 2024-11-11 RX ORDER — DEXAMETHASONE SODIUM PHOSPHATE 4 MG/ML
8 INJECTION, SOLUTION INTRA-ARTICULAR; INTRALESIONAL; INTRAMUSCULAR; INTRAVENOUS; SOFT TISSUE
Status: COMPLETED | OUTPATIENT
Start: 2024-11-11 | End: 2024-11-11

## 2024-11-11 RX ORDER — DIPHENHYDRAMINE HYDROCHLORIDE 50 MG/ML
12.5 INJECTION INTRAMUSCULAR; INTRAVENOUS
Status: COMPLETED | OUTPATIENT
Start: 2024-11-11 | End: 2024-11-11

## 2024-11-11 RX ORDER — IBUPROFEN 600 MG/1
600 TABLET ORAL
Status: COMPLETED | OUTPATIENT
Start: 2024-11-11 | End: 2024-11-11

## 2024-11-11 RX ORDER — MAGNESIUM SULFATE HEPTAHYDRATE 40 MG/ML
2 INJECTION, SOLUTION INTRAVENOUS ONCE
Status: COMPLETED | OUTPATIENT
Start: 2024-11-11 | End: 2024-11-11

## 2024-11-11 RX ORDER — METOCLOPRAMIDE HYDROCHLORIDE 5 MG/ML
10 INJECTION INTRAMUSCULAR; INTRAVENOUS
Status: COMPLETED | OUTPATIENT
Start: 2024-11-11 | End: 2024-11-11

## 2024-11-11 RX ADMIN — METOCLOPRAMIDE 10 MG: 5 INJECTION, SOLUTION INTRAMUSCULAR; INTRAVENOUS at 09:11

## 2024-11-11 RX ADMIN — IBUPROFEN 600 MG: 600 TABLET, FILM COATED ORAL at 09:11

## 2024-11-11 RX ADMIN — MAGNESIUM SULFATE HEPTAHYDRATE 2 G: 40 INJECTION, SOLUTION INTRAVENOUS at 10:11

## 2024-11-11 RX ADMIN — IOHEXOL 100 ML: 350 INJECTION, SOLUTION INTRAVENOUS at 09:11

## 2024-11-11 RX ADMIN — DEXAMETHASONE SODIUM PHOSPHATE 8 MG: 4 INJECTION, SOLUTION INTRA-ARTICULAR; INTRALESIONAL; INTRAMUSCULAR; INTRAVENOUS; SOFT TISSUE at 10:11

## 2024-11-11 RX ADMIN — DIPHENHYDRAMINE HYDROCHLORIDE 12.5 MG: 50 INJECTION INTRAMUSCULAR; INTRAVENOUS at 09:11

## 2024-11-11 NOTE — ED NOTES
Pt sitting up comfortably in bed, awaiting imaging and blood results. Denies needs at this time. Call light within reach.

## 2024-11-11 NOTE — TELEPHONE ENCOUNTER
----- Message from Joy sent at 11/11/2024  8:18 AM CST -----  Contact: [phone] Patient  Patient would like to get medical advice.  Symptoms (please be specific):   Pt states she was speaking to Rubina rodriguezu the pt portal last night about an appt with Dr Bobo on 11/15/24 for 1:15. Pt states she would like to confirm the appt is scheduled. Pt states it is for an ER f/u for her high b/p.   How long have you had these symptoms: N/A  Would you like a call back, or a response through your [facility] portal?:   call back   Pharmacy name and phone # (copy from chart):  N/A   Comments:

## 2024-11-11 NOTE — ED NOTES
Pt arrived to ED with c/o high BP and malaise that started Friday. Denies hx of htn. Pt went to [facility] on Saturday, but sx have worsened. Pt reports lingering headache, mostly felt behind eyes and back of head. Denies fever, chills, congestion, n/v/d. Pt reports that she is peeing more frequently. Pt reports recent hospital stay for broken arm. MD at bedside. Pt changed into gown, placed on automatic BP and pulse ox. Call light within reach.

## 2024-11-11 NOTE — DISCHARGE INSTRUCTIONS
Follow up with your doctor to get started on blood pressure medication.      Follow up with Neurology for your headaches.      Return to the ER if he was shortness of breath, chest pain, are unable to keep food or liquids down.

## 2024-11-11 NOTE — ED PROVIDER NOTES
Encounter Date: 2024       History     Chief Complaint   Patient presents with    Hypertension     Pt reports persistent HTN x3 days. Pt denies HTN hx. Headache and malaise x3 weeks. Pt reports feeling flushed to her face. Pt was seen at [facility] ED on  for same sx. Denies other sx or complaints.      HPI    58-year-old female significant medical history of migraine, UTI, left humerus fracture fixed ORIF 2024 presenting for generalized malaise and concern for blood pressure.      Patient reports taking her blood pressure today.  Her blood pressure was 154/100, she was concerned with the elevated blood pressure.  When she attempted to make a primary care appointment after her previous ED visits for asymptomatic hypotension, the nurse told her to go to the ER since her blood pressure was elevated.    She endorses generalized malaise, and polyuria, with mild shortness of breath on exertion.  She also has a mild headache.  She endorses having headaches every day for the last two weeks.  She normally takes Tylenol or Motrin, it was declining analgesia in the ED.  She denies fever, chills, nausea, vomiting, chest pain, abdominal pain, diarrhea.    On chart review, patient was seen in the emergency department on 2024 for elevated blood pressure.  She was then discharged with instructions for primary care follow up.    Review of patient's allergies indicates:   Allergen Reactions    Keflex [cephalexin] Hives    Pcn [penicillins]     Wellbutrin [bupropion hcl]      Hair loss     Past Medical History:   Diagnosis Date    Migraines     Personal history of colonic polyps 2021     Past Surgical History:   Procedure Laterality Date    AUGMENTATION OF BREAST  1999    BREAST SURGERY       SECTION      COLONOSCOPY N/A 2021    Procedure: COLONOSCOPY;  Surgeon: Andie Alonzo MD;  Location: [facility];  Service: Endoscopy;  Laterality: N/A;    ORIF HUMERUS FRACTURE Left 2024     Procedure: ORIF, FRACTURE, HUMERUS;  Surgeon: Neeraj Mai MD;  Location: [facility] OR;  Service: Orthopedics;  Laterality: Left;  Gita long proximal humerus plates, c-arn. large frag set, beach chair     Family History   Problem Relation Name Age of Onset    Menstrual problems Mother Noa Mujica     Arthritis Mother Noa Mujica     Asthma Mother Noa Mujica     Depression Mother Noa Mujica     Diabetes Mother Noa Mujica     Hypertension Mother Noa Mujica     Arthritis Father Raheem Arreola     Hearing loss Father Raheem Arreola     Hypertension Father Raheem Arreola     Stroke Father Raheem Arreola     Heart disease Neg Hx      Cancer Neg Hx      Breast cancer Neg Hx      Ovarian cancer Neg Hx      Colon cancer Neg Hx       Social History     Tobacco Use    Smoking status: Never    Smokeless tobacco: Never   Substance Use Topics    Alcohol use: No    Drug use: No     Review of Systems  See HPI for pertinent ROS.   Physical Exam     Initial Vitals [11/11/24 0728]   BP Pulse Resp Temp SpO2   (!) 171/100 (!) 115 18 98.3 °F (36.8 °C) 97 %      MAP       --         Physical Exam    Constitutional: She appears well-developed and well-nourished.   HENT:   Head: Normocephalic and atraumatic.   Eyes: Conjunctivae are normal. No scleral icterus.   Neck: No JVD present.   Cardiovascular:  Normal rate, regular rhythm and normal heart sounds.     Exam reveals no gallop and no friction rub.       No murmur heard.  Pulmonary/Chest: Breath sounds normal. No stridor. She has no wheezes. She has no rhonchi. She has no rales.   Abdominal: Abdomen is soft. There is no abdominal tenderness. There is no rebound and no guarding.   Musculoskeletal:         General: No tenderness or edema.     Neurological: She is alert.   Skin: Skin is warm. Capillary refill takes less than 2 seconds.   Psychiatric: She has a normal mood and affect.         ED Course   Procedures  Labs Reviewed   URINALYSIS, REFLEX TO URINE  CULTURE - Abnormal       Result Value    Specimen UA Urine, Clean Catch      Color, UA Yellow      Appearance, UA Clear      pH, UA 7.0      Specific Gravity, UA 1.010      Protein, UA Negative      Glucose, UA Negative      Ketones, UA Negative      Bilirubin (UA) Negative      Occult Blood UA Negative      Nitrite, UA Negative      Urobilinogen, UA Negative      Leukocytes, UA 2+ (*)     Narrative:     Specimen Source->Urine   D DIMER, QUANTITATIVE - Abnormal    D-Dimer 0.73 (*)    COMPREHENSIVE METABOLIC PANEL - Abnormal    Sodium 140      Potassium 4.0      Chloride 107      CO2 22 (*)     Glucose 126 (*)     BUN 11      Creatinine 0.8      Calcium 10.0      Total Protein 7.6      Albumin 4.1      Total Bilirubin 0.4      Alkaline Phosphatase 104      AST 22      ALT 45 (*)     eGFR >60      Anion Gap 11     URINALYSIS MICROSCOPIC - Abnormal    RBC, UA 1      WBC, UA 6 (*)     Bacteria Rare      Squam Epithel, UA 1      Microscopic Comment SEE COMMENT      Narrative:     Specimen Source->Urine   CBC W/ AUTO DIFFERENTIAL    WBC 4.99      RBC 5.04      Hemoglobin 15.1      Hematocrit 44.5      MCV 88      MCH 30.0      MCHC 33.9      RDW 11.8      Platelets 227      MPV 10.4      Immature Granulocytes 0.2      Gran # (ANC) 3.1      Immature Grans (Abs) 0.01      Lymph # 1.3      Mono # 0.4      Eos # 0.1      Baso # 0.05      nRBC 0      Gran % 62.3      Lymph % 26.7      Mono % 8.4      Eosinophil % 1.4      Basophil % 1.0      Differential Method Automated     MAGNESIUM    Magnesium 1.9     TROPONIN I    Troponin I <0.006     TSH    TSH 1.724          ECG Results              EKG 12-lead (In process)        Collection Time Result Time QRS Duration OHS QTC Calculation    11/11/24 07:57:05 11/11/24 10:17:47 62 436                     In process by Interface, Lab In [facility] (11/11/24 10:17:51)                   Narrative:    Test Reason : R06.02,    Vent. Rate : 112 BPM     Atrial Rate : 112 BPM     P-R Int : 128  ms          QRS Dur : 062 ms      QT Int : 320 ms       P-R-T Axes : 062 007 049 degrees     QTc Int : 436 ms    Sinus tachycardia  Cannot rule out Anterior infarct ,age undetermined  Abnormal ECG  No previous ECGs available    Referred By: [email or web address]   SELF           Confirmed By:                                   Imaging Results              CTA Chest Non-Coronary (PE Studies) (Final result)  Result time 11/11/24 11:04:25      Final result by Jeanie Peters MD (11/11/24 11:04:25)                   Impression:      No evidence of acute pulmonary embolus.    Hepatic steatosis.      Electronically signed by: Jeanie Peters MD  Date:    11/11/2024  Time:    11:04               Narrative:    EXAMINATION:  CTA CHEST NON CORONARY (PE STUDIES)    CLINICAL HISTORY:  Pulmonary embolism (PE) suspected, positive D-dimer;    TECHNIQUE:  Low dose axial images, sagittal and coronal reformations were obtained from the thoracic inlet to the lung bases following the IV administration of 100 mL of Omnipaque 350.  Contrast timing was optimized to evaluate the pulmonary arteries.  MIP images were performed.    COMPARISON:  None    FINDINGS:  Vasculature: Good opacification of the pulmonary arterial system.  No acute pulmonary embolus identified. Thoracic aorta normal caliber. No evidence of dissection.    Lungs: The tracheobronchial tree is clear. No lung consolidation. No emphysematous lung changes.No pleural effusion.    Mediastinum: No lymphadenopathy.  The cardiac silhouette is normal in size.No significant coronary artery calcifications.  No pericardial effusion.The esophagus course normally.    Upper abdomen (visualized portion):Severe hepatic steatosis, partially seen    Bones/chest wall: Breast implants.  Postoperative changes of the left proximal humerus, ORIF.  S shaped thoracolumbar scoliosis.                                       X-Ray Chest PA And Lateral (Final result)  Result time 11/11/24 08:35:35       Final result by Paco Marin MD (11/11/24 08:35:35)                   Impression:      No acute abnormality.      Electronically signed by: Paco Marin MD  Date:    11/11/2024  Time:    08:35               Narrative:    EXAMINATION:  XR CHEST PA AND LATERAL    CLINICAL HISTORY:  Shortness of breath    TECHNIQUE:  PA and lateral views of the chest were performed.    COMPARISON:  No prior study    FINDINGS:  The trachea and cardiomediastinal silhouette are within normal limits.  There is no evidence of pleural effusions, pneumothoraces or consolidations.  Lungs are clear.  Osseous structures demonstrate no evidence for acute fractures or dislocations.                                       Medications   ibuprofen tablet 600 mg (600 mg Oral Given 11/11/24 0922)   metoclopramide injection 10 mg (10 mg Intravenous Given 11/11/24 0957)   diphenhydrAMINE injection 12.5 mg (12.5 mg Intravenous Given 11/11/24 0959)   magnesium sulfate 2g in water 50mL IVPB (premix) (0 g Intravenous Stopped 11/11/24 1037)   dexAMETHasone injection 8 mg (8 mg Intramuscular Given 11/11/24 1000)   iohexoL (OMNIPAQUE 350) injection 100 mL (100 mLs Intravenous Given 11/11/24 0950)     Medical Decision Making             ED Course as of 11/11/24 1120   Mon Nov 11, 2024   0820 CBC auto differential  No evidence of leukocytosis, acute anemia requiring transfusion or thrombocytopenia.   [KB]   0909 TSH  Hypothyroidism/hyperthyroidism less likely the cause of presentation   [KB]   0909 Comprehensive metabolic panel(!)  No MARIO, no significant electrolyte abnormality,  no evidence of acute hepatobiliary obstruction.   [KB]   0909 Troponin I  ACS less likely  [KB]   0920 X-Ray Chest PA And Lateral  On my personal interpretation of this CXR, there is no evidence of acute pneumonia, pneumothorax, pleural effusion, or pulmonary edema.    [KB]   1107 CTA Chest Non-Coronary (PE Studies)  Impression:     No evidence of acute pulmonary embolus.     Hepatic  steatosis.   [KB]      ED Course User Index  [KB] Bridgett Hayes MD                       EKG:  Sinus tachycardia, rate 112, normal axis deviation, no QTC prolongation or ST segment elevation  See ED course for personal interpretation of workup/ differential.     58-year-old female described as above presenting for concern for blood pressure management.  On arrival, BP elevated but no chest pain.   She presented after she spoke with the PCP nurse who instructed her to the ED.  Physical exam reassuring.  She was given motrin for headache, then for continued headache, she was given migraine cocktail to include magnesium, Decadron, Benadryl, Reglan.  On re-evaluation, her headache has resolved.  Troponin within normal limits, however D-dimer elevated at 0.73.  CTA PE completed and ruled out acute PE.  She was discharged in stable condition.  Internal referral placed for neurology follow up for headache, and instructions to follow up with primary care physician for hypertension.        Clinical Impression:  Final diagnoses:  [R06.02] Shortness of breath  [R03.0] Elevated blood pressure reading (Primary)  [G44.89] Other headache syndrome          ED Disposition Condition    Discharge Stable          ED Prescriptions    None       Follow-up Information       Follow up With Specialties Details Why Contact Info    [facility] Emergency Dept Emergency Medicine Go to  If symptoms worsen [address] [address] [phone]    Lynad Bobo MD Internal Medicine Schedule an appointment as soon as possible for a visit   [address]  [address]  [facility] [address]  [phone]               Bridgett Hayes MD  Resident  11/11/24 [id number]

## 2024-11-11 NOTE — ED NOTES
Pt resting comfortably in bed, awaiting CT scan. Denies needs at this time. Call light within reach.

## 2024-11-11 NOTE — ED NOTES
Pt reports headache pain has decreased to 2/10 pain. Resting comfortably in bed. Denies needs at this time, awaiting lab and imaging results. Call light within reach.

## 2024-11-11 NOTE — PROVIDER PROGRESS NOTES - EMERGENCY DEPT.
EKG interpretation by ED attending physician:  Sinus tach, rate 112, no ST changes, no ischemia, normal intervals.  No prior for comparison.

## 2024-11-12 LAB
OHS QRS DURATION: 62 MS
OHS QTC CALCULATION: 436 MS

## 2024-11-15 ENCOUNTER — PATIENT MESSAGE (OUTPATIENT)
Dept: PRIMARY CARE CLINIC | Facility: CLINIC | Age: 59
End: 2024-11-15

## 2024-11-15 ENCOUNTER — TELEPHONE (OUTPATIENT)
Dept: PRIMARY CARE CLINIC | Facility: CLINIC | Age: 59
End: 2024-11-15

## 2024-11-15 ENCOUNTER — OFFICE VISIT (OUTPATIENT)
Dept: PRIMARY CARE CLINIC | Facility: CLINIC | Age: 59
End: 2024-11-15
Payer: COMMERCIAL

## 2024-11-15 VITALS
DIASTOLIC BLOOD PRESSURE: 84 MMHG | WEIGHT: 191.38 LBS | BODY MASS INDEX: 32.67 KG/M2 | OXYGEN SATURATION: 98 % | HEART RATE: 102 BPM | SYSTOLIC BLOOD PRESSURE: 136 MMHG | HEIGHT: 64 IN

## 2024-11-15 DIAGNOSIS — F41.9 ANXIETY AND DEPRESSION: ICD-10-CM

## 2024-11-15 DIAGNOSIS — E66.811 CLASS 1 OBESITY DUE TO EXCESS CALORIES WITHOUT SERIOUS COMORBIDITY WITH BODY MASS INDEX (BMI) OF 32.0 TO 32.9 IN ADULT: ICD-10-CM

## 2024-11-15 DIAGNOSIS — Z12.12 ENCOUNTER FOR COLORECTAL CANCER SCREENING: ICD-10-CM

## 2024-11-15 DIAGNOSIS — E66.09 CLASS 1 OBESITY DUE TO EXCESS CALORIES WITHOUT SERIOUS COMORBIDITY WITH BODY MASS INDEX (BMI) OF 32.0 TO 32.9 IN ADULT: ICD-10-CM

## 2024-11-15 DIAGNOSIS — J30.9 ALLERGIC RHINITIS, UNSPECIFIED SEASONALITY, UNSPECIFIED TRIGGER: ICD-10-CM

## 2024-11-15 DIAGNOSIS — G43.009 MIGRAINE WITHOUT AURA AND WITHOUT STATUS MIGRAINOSUS, NOT INTRACTABLE: ICD-10-CM

## 2024-11-15 DIAGNOSIS — F32.A ANXIETY AND DEPRESSION: ICD-10-CM

## 2024-11-15 DIAGNOSIS — Z00.00 ANNUAL PHYSICAL EXAM: Primary | ICD-10-CM

## 2024-11-15 DIAGNOSIS — Z78.0 ASYMPTOMATIC POSTMENOPAUSAL STATE: ICD-10-CM

## 2024-11-15 DIAGNOSIS — I10 ESSENTIAL HYPERTENSION: ICD-10-CM

## 2024-11-15 DIAGNOSIS — Z12.31 ENCOUNTER FOR SCREENING MAMMOGRAM FOR MALIGNANT NEOPLASM OF BREAST: ICD-10-CM

## 2024-11-15 DIAGNOSIS — Z12.11 ENCOUNTER FOR COLORECTAL CANCER SCREENING: ICD-10-CM

## 2024-11-15 DIAGNOSIS — S42.355D CLOSED NONDISPLACED COMMINUTED FRACTURE OF SHAFT OF LEFT HUMERUS WITH ROUTINE HEALING, SUBSEQUENT ENCOUNTER: ICD-10-CM

## 2024-11-15 PROBLEM — G47.00 INSOMNIA: Status: RESOLVED | Noted: 2018-10-08 | Resolved: 2024-11-15

## 2024-11-15 PROBLEM — N30.00 ACUTE CYSTITIS WITHOUT HEMATURIA: Status: RESOLVED | Noted: 2023-12-19 | Resolved: 2024-11-15

## 2024-11-15 PROCEDURE — 99999 PR PBB SHADOW E&M-EST. PATIENT-LVL III: CPT | Mod: PBBFAC,,, | Performed by: INTERNAL MEDICINE

## 2024-11-15 NOTE — TELEPHONE ENCOUNTER
----- Message from Debbi sent at 11/15/2024  3:34 PM CST -----  Contact: Paddy with [facility] [phone]  Pharmacy is calling to clarify an RX.    RX name:  hydrocortisone-pramoxine (PROCTOFOAM-HS) rectal foam    What do they need to clarify:  HS is no longer sold - pharmacy has HC available    Comments: Please advise if this change is approved.

## 2024-11-15 NOTE — PROGRESS NOTES
[facility] Note    Chief Complaint      Chief Complaint   Patient presents with    Annual Exam    Hypertension     History of Present Illness      Yasmine Villa is a 58 y.o. female who presents today for Annual preventative visit.  Patient comes to appointment alone.    Problem List Items Addressed This Visit       Allergic rhinitis    Current Assessment & Plan     Stable on zyrtec and flonase, works pretty well for her.         Anxiety and depression    Current Assessment & Plan     Stable. Takes 1/2 xanax 1-2 times per week.   No panic attacks/SI/HI.  Lost last job but has a less stressful job now. Dad had stroke.    From previous visits: Zoloft made her apathetic. Lexapro/Cymbalta gave her diarrhea, wellbutrin caused hair loss.  Feels like situation with mom is the main  of her anxiety.         Class 1 obesity due to excess calories without serious comorbidity with body mass index (BMI) of 32.0 to 32.9 in adult    Current Assessment & Plan     Has not been exercising, trying to work on diet.  Doesn't like fast food.  Tries to cook.         Relevant Orders    Hemoglobin A1C    Closed fracture of shaft of left humerus    Current Assessment & Plan     9/2024 after fall walking into dentist's office. S/p ORIF 9/19/24 with Dr. Wheat, starting PT soon.         Essential hypertension    Current Assessment & Plan     BP elevated on 11/11 with trip to ED, WNL today.         Migraine without aura and without status migrainosus, not intractable    Current Assessment & Plan     Stable on imitrex PRN, takes 3 per month.  Takes ibuprofen sometimes.            Other Visit Diagnoses       Annual physical exam    -  Primary    Relevant Orders    Lipid Panel    Asymptomatic postmenopausal state        Relevant Orders    DXA Bone Density Axial Skeleton 1 or more sites    Encounter for screening mammogram for malignant neoplasm of breast        Relevant Orders    Mammo Digital Screening Bilat w/ Guille     Encounter for colorectal cancer screening        Relevant Orders    Case Request Endoscopy: COLONOSCOPY (Completed)                Health Maintenance   Topic Date Due    TETANUS VACCINE  Never done    Shingles Vaccine (1 of 2) Never done    Colorectal Cancer Screening  2024    Mammogram  2024    Lipid Panel  2028    Hepatitis C Screening  Completed       Past Medical History:   Diagnosis Date    Migraines     Personal history of colonic polyps 2021       Past Surgical History:   Procedure Laterality Date    AUGMENTATION OF BREAST  1999    BREAST SURGERY       SECTION      COLONOSCOPY N/A 2021    Procedure: COLONOSCOPY;  Surgeon: Andie Alonzo MD;  Location: [facility];  Service: Endoscopy;  Laterality: N/A;    ORIF HUMERUS FRACTURE Left 2024    Procedure: ORIF, FRACTURE, HUMERUS;  Surgeon: Neeraj Mai MD;  Location: [facility] OR;  Service: Orthopedics;  Laterality: Left;  Gita long proximal humerus plates, c-arn. large frag set, beach chair       family history includes Arthritis in her father and mother; Asthma in her mother; Depression in her mother; Diabetes in her mother; Hearing loss in her father; Hypertension in her father and mother; Menstrual problems in her mother; Stroke in her father.    Social History     Tobacco Use    Smoking status: Never    Smokeless tobacco: Never   Substance Use Topics    Alcohol use: No    Drug use: No       Review of Systems   Constitutional:  Negative for chills and fever.   HENT:  Negative for sore throat.    Respiratory:  Negative for cough and shortness of breath.    Cardiovascular:  Negative for chest pain and palpitations.   Gastrointestinal:  Negative for constipation, diarrhea, nausea and vomiting.   Genitourinary:  Negative for dysuria and hematuria.   Musculoskeletal:  Negative for falls.   Neurological:  Negative for headaches.        Outpatient Encounter Medications as of 11/15/2024   Medication Sig Dispense  Refill    aspirin (ECOTRIN) 81 MG EC tablet Take 1 tablet (81 mg total) by mouth 2 (two) times a day. 60 tablet 0    cetirizine (ZYRTEC) 10 MG tablet Take 1 tablet (10 mg total) by mouth once daily. 30 tablet 1    fluticasone propionate (FLONASE) 50 mcg/actuation nasal spray 1 spray (50 mcg total) by Each Nostril route once daily. 16 g 3    sumatriptan (IMITREX) 100 MG tablet Take 1 tablet (100 mg total) by mouth every 2 (two) hours as needed for Migraine (can take 2nd tablet 2 hours later (not to exceed 2 tablets in 24 hours)). 9 tablet 5    hydrocortisone-pramoxine (PROCTOFOAM-HS) rectal foam Place 1 applicator rectally 2 (two) times daily. 10 g 5    [DISCONTINUED] ALPRAZolam (XANAX) 0.25 MG tablet Take 1 tablet (0.25 mg total) by mouth daily as needed for Anxiety. (Patient not taking: Reported on 11/15/2024) 30 tablet 1    [DISCONTINUED] azelastine (ASTELIN) 137 mcg (0.1 %) nasal spray 1 spray (137 mcg total) by Nasal route 2 (two) times daily. (Patient not taking: Reported on 11/15/2024) 30 mL 0    [DISCONTINUED] cromolyn (NASALCHROM) 5.2 mg/spray (4 %) nasal spray 1 spray by Nasal route 4 (four) times daily. (Patient not taking: Reported on 11/15/2024) 26 mL 1    [DISCONTINUED] fluticasone propionate (FLONASE) 50 mcg/actuation nasal spray 1 spray (50 mcg total) by Each Nostril route once daily. (Patient not taking: Reported on 11/15/2024) 16 g 1    [DISCONTINUED] HYDROcodone-acetaminophen (NORCO) 5-325 mg per tablet Take 1 tablet by mouth every 6 (six) hours as needed for Pain. (Patient not taking: Reported on 11/15/2024) 28 tablet 0    [DISCONTINUED] ondansetron (ZOFRAN) 4 MG tablet Take 1 tablet (4 mg total) by mouth 2 (two) times daily. (Patient not taking: Reported on 11/15/2024) 10 tablet 0    [DISCONTINUED] oxyCODONE-acetaminophen (PERCOCET) 5-325 mg per tablet Take 1 tablet by mouth every 6 (six) hours as needed for Pain. (Patient not taking: Reported on 11/15/2024) 15 tablet 0    [DISCONTINUED]  "predniSONE (DELTASONE) 10 MG tablet Take 1 tablet (10 mg total) by mouth once daily. (Patient not taking: Reported on 11/15/2024) 4 tablet 0    [DISCONTINUED] sodium chloride (SALINE NASAL) 0.65 % nasal spray 2 sprays by Nasal route as needed for Congestion. (Patient not taking: Reported on 11/15/2024) 60 mL 12    [DISCONTINUED] sulfamethoxazole-trimethoprim 800-160mg (BACTRIM DS) 800-160 mg Tab Take 1 tablet by mouth 2 (two) times daily. (Patient not taking: Reported on 11/15/2024) 10 tablet 0    [DISCONTINUED] temazepam (RESTORIL) 30 mg capsule Take 1 capsule (30 mg total) by mouth nightly. (Patient not taking: Reported on 11/15/2024) 30 capsule 5    [DISCONTINUED] traZODone (DESYREL) 50 MG tablet Take 1 tablet (50 mg total) by mouth every evening. (Patient not taking: Reported on 11/15/2024) 30 tablet 11     No facility-administered encounter medications on file as of 11/15/2024.        Review of patient's allergies indicates:   Allergen Reactions    Keflex [cephalexin] Hives    Pcn [penicillins]     Wellbutrin [bupropion hcl]      Hair loss       Physical Exam      Vital Signs  Pulse: 102  SpO2: 98 %  BP: 136/84  Pain Score: 0-No pain  Height and Weight  Height: 5' 4" (162.6 cm)  Weight: 86.8 kg (191 lb 5.8 oz)  BSA (Calculated - sq m): 1.98 sq meters  BMI (Calculated): 32.8  Weight in (lb) to have BMI = 25: 145.3]    Physical Exam  Constitutional:       Appearance: She is well-developed.   HENT:      Head: Normocephalic and atraumatic.      Right Ear: External ear normal.      Left Ear: External ear normal.   Eyes:      General:         Right eye: No discharge.         Left eye: No discharge.   Cardiovascular:      Rate and Rhythm: Normal rate and regular rhythm.      Heart sounds: Normal heart sounds. No murmur heard.  Pulmonary:      Effort: Pulmonary effort is normal. No respiratory distress.      Breath sounds: Normal breath sounds.   Abdominal:      General: There is no distension.      Palpations: Abdomen " "is soft.      Tenderness: There is no abdominal tenderness. There is no guarding.   Musculoskeletal:         General: Normal range of motion.      Cervical back: Normal range of motion.   Skin:     General: Skin is warm and dry.   Neurological:      Mental Status: She is alert and oriented to person, place, and time.   Psychiatric:         Behavior: Behavior normal.          Laboratory:  CBC:  Recent Labs   Lab Result Units 09/18/24  1353 11/11/24  0809   WBC K/uL 7.41 4.99   RBC M/uL 4.27 5.04   Hemoglobin g/dL 12.8 15.1   Hematocrit % 38.1 44.5   Platelets K/uL 214 227   MCV fL 89 88   MCH pg 30.0 30.0   MCHC g/dL 33.6 33.9       CMP:  Recent Labs   Lab Result Units 11/11/24  0809   Glucose mg/dL 126*   Calcium mg/dL 10.0   Albumin g/dL 4.1   Total Protein g/dL 7.6   Sodium mmol/L 140   Potassium mmol/L 4.0   CO2 mmol/L 22*   Chloride mmol/L 107   BUN mg/dL 11   Alkaline Phosphatase U/L 104   ALT U/L 45*   AST U/L 22   Total Bilirubin mg/dL 0.4       URINALYSIS:  Recent Labs   Lab Result Units 11/11/24  0751   Color, UA  Yellow   Specific Gravity, UA  1.010   pH, UA  7.0   Protein, UA  Negative   Bacteria /hpf Rare   Nitrite, UA  Negative   Leukocytes, UA  2+*   Urobilinogen, UA EU/dL Negative      LIPIDS:  Recent Labs   Lab Result Units 11/11/24  0812   TSH uIU/mL 1.724       TSH:  Recent Labs   Lab Result Units 11/11/24  0812   TSH uIU/mL 1.724       A1C:  No results for input(s): "HGBA1C" in the last 2160 hours.        Radiology:  No results found in the last 30 days.     Assessment/Plan     Yasmine Villa is a 58 y.o.female with:    1. Annual physical exam  - Lipid Panel; Future    2. Essential hypertension    3. Anxiety and depression    4. Migraine without aura and without status migrainosus, not intractable    5. Class 1 obesity due to excess calories without serious comorbidity with body mass index (BMI) of 32.0 to 32.9 in adult  - Hemoglobin A1C; Future    6. Allergic rhinitis, unspecified seasonality, " unspecified trigger    7. Closed nondisplaced comminuted fracture of shaft of left humerus with routine healing, subsequent encounter    8. Asymptomatic postmenopausal state  - DXA Bone Density Axial Skeleton 1 or more sites; Future    9. Encounter for screening mammogram for malignant neoplasm of breast  - Mammo Digital Screening Bilat w/ Guille; Future    10. Encounter for colorectal cancer screening  - Case Request Endoscopy: COLONOSCOPY        -check DEXA given recent fx  -track BP at home, will send me readings in 1 week  -Continue current medications and maintain follow up with specialists.    -Follow up in about 1 year (around 11/15/2025) for Annual Visit.       Lynda Bobo MD  [facility]

## 2024-11-15 NOTE — ASSESSMENT & PLAN NOTE
9/2024 after fall walking into dentist's office. S/p ORIF 9/19/24 with Dr. Wheat, starting PT soon.

## 2024-11-19 ENCOUNTER — PATIENT MESSAGE (OUTPATIENT)
Dept: ADMINISTRATIVE | Facility: HOSPITAL | Age: 59
End: 2024-11-19
Payer: COMMERCIAL

## 2024-12-12 ENCOUNTER — PATIENT MESSAGE (OUTPATIENT)
Dept: PRIMARY CARE CLINIC | Facility: CLINIC | Age: 59
End: 2024-12-12
Payer: COMMERCIAL

## 2024-12-12 DIAGNOSIS — Z12.11 ENCOUNTER FOR COLORECTAL CANCER SCREENING: Primary | ICD-10-CM

## 2024-12-12 DIAGNOSIS — Z12.12 ENCOUNTER FOR COLORECTAL CANCER SCREENING: Primary | ICD-10-CM

## 2024-12-13 RX ORDER — VALSARTAN 40 MG/1
40 TABLET ORAL DAILY
Qty: 90 TABLET | Refills: 3 | Status: SHIPPED | OUTPATIENT
Start: 2024-12-13 | End: 2025-12-13

## 2024-12-13 NOTE — TELEPHONE ENCOUNTER
Pt states she has not yet received a call to schedule colonoscopy. Looks like the order was created and retracted on the same day. Pended for your review. Looks like her last colonoscopy was in 21 and to repeat in 3 years.

## 2024-12-16 NOTE — PROGRESS NOTES
Patient ID:   Yasmine Villa is a 58 y.o. female.    Chief Complaint:   12w 5d status post open reduction internal fixation left proximal humerus fracture    HPI:   Miss Villa is returning now just over 3 months out from her left proximal humerus fracture open reduction internal fixation.  She is doing well.  She has a 0/10 pain intensity.  She is currently in physical therapy.    Medications:    Current Outpatient Medications:     cetirizine (ZYRTEC) 10 MG tablet, Take 1 tablet (10 mg total) by mouth once daily., Disp: 30 tablet, Rfl: 1    fluticasone propionate (FLONASE) 50 mcg/actuation nasal spray, 1 spray (50 mcg total) by Each Nostril route once daily., Disp: 16 g, Rfl: 3    sumatriptan (IMITREX) 100 MG tablet, Take 1 tablet (100 mg total) by mouth every 2 (two) hours as needed for Migraine (can take 2nd tablet 2 hours later (not to exceed 2 tablets in 24 hours))., Disp: 9 tablet, Rfl: 5    valsartan (DIOVAN) 40 MG tablet, Take 1 tablet (40 mg total) by mouth once daily., Disp: 90 tablet, Rfl: 3    Allergies:  Review of patient's allergies indicates:   Allergen Reactions    Keflex [cephalexin] Hives    Pcn [penicillins]     Wellbutrin [bupropion hcl]      Hair loss       Past Medical History:  Past Medical History:   Diagnosis Date    Migraines     Personal history of colonic polyps 2021        Past Surgical History:  Past Surgical History:   Procedure Laterality Date    AUGMENTATION OF BREAST  1999    BREAST SURGERY       SECTION      COLONOSCOPY N/A 2021    Procedure: COLONOSCOPY;  Surgeon: Andie Alonzo MD;  Location: [facility] ENDO;  Service: Endoscopy;  Laterality: N/A;    ORIF HUMERUS FRACTURE Left 2024    Procedure: ORIF, FRACTURE, HUMERUS;  Surgeon: Neeraj Mai MD;  Location: [facility] OR;  Service: Orthopedics;  Laterality: Left;  [place] long proximal humerus plates, c-arn. large frag set, beach chair       Social History:  Social History     Occupational History    Not on  file   Tobacco Use    Smoking status: Never    Smokeless tobacco: Never   Substance and Sexual Activity    Alcohol use: No    Drug use: No    Sexual activity: Not Currently     Partners: Male     Birth control/protection: None       Family History:  Family History   Problem Relation Name Age of Onset    Menstrual problems Mother Noa Mujica     Arthritis Mother Noa Mujica     Asthma Mother Noa Mujica     Depression Mother Noa Mujica     Diabetes Mother Noa Mujica     Hypertension Mother Noa Mujica     Arthritis Father Raheem Arreola     Hearing loss Father Raheem Arreola     Hypertension Father Raheem Arreola     Stroke Father Raheem Arreola     Heart disease Neg Hx      Cancer Neg Hx      Breast cancer Neg Hx      Ovarian cancer Neg Hx      Colon cancer Neg Hx          ROS:  Review of Systems   Musculoskeletal:  Negative for joint pain, joint swelling, myalgias and stiffness.   All other systems reviewed and are negative.      Vitals:  LMP 10/01/2021 (Exact Date)     Physical Examination:  Comprehensive Orthopaedic Musculoskeletal Exam    General      Constitutional: appears stated age, mildly obese, well-developed and well-nourished    Scleral icterus: no    Labored breathing: no    Psychiatric: normal mood and affect and no acute distress    Neurological: alert and oriented x3    Skin: intact    Lymphadenopathy: none     Ortho Exam   Left upper extremity exam:   She has essentially full active elevation of the left shoulder.  She has full range of motion of the left elbow.    Imaging:  I have ordered and independently reviewed the following imaging studies performed at [facility] today    Left humerus x-ray series demonstrates maintenance of the fracture reduction and hardware.  No evidence of any hardware loosening.  There is callus formation at the proximal portion of the fracture.  The fracture lines or lessened in the appearance.    Assessment:  1. Closed fracture of shaft of left humerus  with routine healing, unspecified fracture morphology, subsequent encounter      Plan:  The patient was instructed to continue her physical therapy until completion.  She may weight bear as tolerated on the left upper extremity.  She will return if needed in the future.     No follow-ups on file.

## 2024-12-17 ENCOUNTER — OFFICE VISIT (OUTPATIENT)
Dept: ORTHOPEDICS | Facility: CLINIC | Age: 59
End: 2024-12-17
Payer: COMMERCIAL

## 2024-12-17 ENCOUNTER — HOSPITAL ENCOUNTER (OUTPATIENT)
Dept: RADIOLOGY | Facility: HOSPITAL | Age: 59
Discharge: HOME | End: 2024-12-17
Attending: ORTHOPAEDIC SURGERY
Payer: COMMERCIAL

## 2024-12-17 VITALS — WEIGHT: 191.38 LBS | BODY MASS INDEX: 32.67 KG/M2 | HEIGHT: 64 IN

## 2024-12-17 DIAGNOSIS — S42.302D CLOSED FRACTURE OF SHAFT OF LEFT HUMERUS WITH ROUTINE HEALING, UNSPECIFIED FRACTURE MORPHOLOGY, SUBSEQUENT ENCOUNTER: ICD-10-CM

## 2024-12-17 DIAGNOSIS — S42.302D CLOSED FRACTURE OF SHAFT OF LEFT HUMERUS WITH ROUTINE HEALING, UNSPECIFIED FRACTURE MORPHOLOGY, SUBSEQUENT ENCOUNTER: Primary | ICD-10-CM

## 2024-12-17 PROCEDURE — 99024 POSTOP FOLLOW-UP VISIT: CPT | Mod: S$GLB,,, | Performed by: ORTHOPAEDIC SURGERY

## 2024-12-17 PROCEDURE — 1159F MED LIST DOCD IN RCRD: CPT | Mod: CPTII,S$GLB,, | Performed by: ORTHOPAEDIC SURGERY

## 2024-12-17 PROCEDURE — 3044F HG A1C LEVEL LT 7.0%: CPT | Mod: CPTII,S$GLB,, | Performed by: ORTHOPAEDIC SURGERY

## 2024-12-17 PROCEDURE — 99999 PR PBB SHADOW E&M-EST. PATIENT-LVL III: CPT | Mod: PBBFAC,,, | Performed by: ORTHOPAEDIC SURGERY

## 2024-12-17 PROCEDURE — 73060 X-RAY EXAM OF HUMERUS: CPT | Mod: TC,PN,LT

## 2024-12-17 PROCEDURE — 3008F BODY MASS INDEX DOCD: CPT | Mod: CPTII,S$GLB,, | Performed by: ORTHOPAEDIC SURGERY

## 2024-12-17 PROCEDURE — 4010F ACE/ARB THERAPY RXD/TAKEN: CPT | Mod: CPTII,S$GLB,, | Performed by: ORTHOPAEDIC SURGERY

## 2024-12-17 PROCEDURE — 1160F RVW MEDS BY RX/DR IN RCRD: CPT | Mod: CPTII,S$GLB,, | Performed by: ORTHOPAEDIC SURGERY

## 2024-12-17 PROCEDURE — 73060 X-RAY EXAM OF HUMERUS: CPT | Mod: 26,LT,, | Performed by: RADIOLOGY

## 2024-12-31 ENCOUNTER — TELEPHONE (OUTPATIENT)
Dept: ENDOSCOPY | Facility: HOSPITAL | Age: 59
End: 2024-12-31
Payer: COMMERCIAL

## 2024-12-31 ENCOUNTER — PATIENT MESSAGE (OUTPATIENT)
Dept: PRIMARY CARE CLINIC | Facility: CLINIC | Age: 59
End: 2024-12-31
Payer: COMMERCIAL

## 2024-12-31 DIAGNOSIS — Z12.11 SPECIAL SCREENING FOR MALIGNANT NEOPLASMS, COLON: Primary | ICD-10-CM

## 2024-12-31 RX ORDER — SODIUM, POTASSIUM,MAG SULFATES 17.5-3.13G
1 SOLUTION, RECONSTITUTED, ORAL ORAL DAILY
Qty: 1 KIT | Refills: 0 | Status: SHIPPED | OUTPATIENT
Start: 2024-12-31 | End: 2025-01-02

## 2024-12-31 NOTE — TELEPHONE ENCOUNTER
Referral for procedure from Telephone call - direct access patient      Spoke to pt to schedule procedure(s) Colonoscopy       Physician to perform procedure(s) Dr. LUZ MARIA Galvan  Date of Procedure (s) 2/4/25  Arrival Time 11:00 AM  Time of Procedure(s) 12:00 PM   Location of Procedure(s) [address]   Type of Rx Prep sent to patient: Suprep  Instructions provided to patient via [facility]    Patient was informed on the following information and verbalized understanding. Screening questionnaire reviewed with patient and complete. If procedure requires anesthesia, a responsible adult needs to be present to accompany the patient home, patient cannot drive after receiving anesthesia. Appointment details are tentative, especially check-in time. Patient will receive a prep-op call 7 days prior to confirm check-in time for procedure. If applicable the patient should contact their pharmacy to verify Rx for procedure prep is ready for pick-up. Patient was advised to call the scheduling department at [phone] if pharmacy states no Rx is available. Patient was advised to call the endoscopy scheduling department if any questions or concerns arise.      [facility] Scheduling Department

## 2025-01-26 ENCOUNTER — PATIENT MESSAGE (OUTPATIENT)
Dept: PRIMARY CARE CLINIC | Facility: CLINIC | Age: 60
End: 2025-01-26
Payer: COMMERCIAL

## 2025-01-27 RX ORDER — VALSARTAN 80 MG/1
80 TABLET ORAL DAILY
Qty: 90 TABLET | Refills: 3 | Status: SHIPPED | OUTPATIENT
Start: 2025-01-27 | End: 2026-01-27

## 2025-01-27 NOTE — TELEPHONE ENCOUNTER
Pt f/u on 12/31/2024 pt message in which she provided BP readings and provider instructed to take valsartan 40 mg 2 tabs daily    Pt has provided BP readings for the past 3 weeks

## 2025-01-28 ENCOUNTER — PATIENT MESSAGE (OUTPATIENT)
Dept: ENDOSCOPY | Facility: HOSPITAL | Age: 60
End: 2025-01-28
Payer: COMMERCIAL

## 2025-02-28 ENCOUNTER — TELEPHONE (OUTPATIENT)
Dept: OBSTETRICS AND GYNECOLOGY | Facility: CLINIC | Age: 60
End: 2025-02-28
Payer: COMMERCIAL

## 2025-02-28 NOTE — TELEPHONE ENCOUNTER
I informed pt here provider will not be in on Monday 3/3/525 due to a scheduled vacation.  Per pt she does not want to reschedule her appt.

## 2025-03-25 ENCOUNTER — PATIENT MESSAGE (OUTPATIENT)
Dept: PRIMARY CARE CLINIC | Facility: CLINIC | Age: 60
End: 2025-03-25
Payer: COMMERCIAL

## 2025-05-26 ENCOUNTER — OFFICE VISIT (OUTPATIENT)
Dept: OBSTETRICS AND GYNECOLOGY | Facility: CLINIC | Age: 60
End: 2025-05-26
Payer: COMMERCIAL

## 2025-05-26 VITALS
SYSTOLIC BLOOD PRESSURE: 136 MMHG | BODY MASS INDEX: 33.45 KG/M2 | DIASTOLIC BLOOD PRESSURE: 92 MMHG | WEIGHT: 194.88 LBS

## 2025-05-26 DIAGNOSIS — Z01.419 WELL WOMAN EXAM WITH ROUTINE GYNECOLOGICAL EXAM: Primary | ICD-10-CM

## 2025-05-26 PROCEDURE — 99396 PREV VISIT EST AGE 40-64: CPT | Mod: S$GLB,,, | Performed by: OBSTETRICS & GYNECOLOGY

## 2025-05-26 PROCEDURE — 4010F ACE/ARB THERAPY RXD/TAKEN: CPT | Mod: CPTII,S$GLB,, | Performed by: OBSTETRICS & GYNECOLOGY

## 2025-05-26 PROCEDURE — 3075F SYST BP GE 130 - 139MM HG: CPT | Mod: CPTII,S$GLB,, | Performed by: OBSTETRICS & GYNECOLOGY

## 2025-05-26 PROCEDURE — 88175 CYTOPATH C/V AUTO FLUID REDO: CPT | Mod: TC | Performed by: OBSTETRICS & GYNECOLOGY

## 2025-05-26 PROCEDURE — 99999 PR PBB SHADOW E&M-EST. PATIENT-LVL III: CPT | Mod: PBBFAC,,, | Performed by: OBSTETRICS & GYNECOLOGY

## 2025-05-26 PROCEDURE — 1160F RVW MEDS BY RX/DR IN RCRD: CPT | Mod: CPTII,S$GLB,, | Performed by: OBSTETRICS & GYNECOLOGY

## 2025-05-26 PROCEDURE — 3080F DIAST BP >= 90 MM HG: CPT | Mod: CPTII,S$GLB,, | Performed by: OBSTETRICS & GYNECOLOGY

## 2025-05-26 PROCEDURE — 1159F MED LIST DOCD IN RCRD: CPT | Mod: CPTII,S$GLB,, | Performed by: OBSTETRICS & GYNECOLOGY

## 2025-05-26 PROCEDURE — 3008F BODY MASS INDEX DOCD: CPT | Mod: CPTII,S$GLB,, | Performed by: OBSTETRICS & GYNECOLOGY

## 2025-05-26 NOTE — PROGRESS NOTES
CC: Annual check-up    SUBJECTIVE:   59 y.o. female   for annual routine Pap and checkup. Patient's last menstrual period was 10/01/2021 (exact date)..  She has no unusual complaints.    Works at the  office   PAP  NIL but high risk HPV type 16 +  Colpo  1:00 O'clock OKSANA 1  Past Medical History:   Diagnosis Date    Migraines     Personal history of colonic polyps 2021     Past Surgical History:   Procedure Laterality Date    AUGMENTATION OF BREAST  1999    BREAST SURGERY       SECTION      COLONOSCOPY N/A 2021    Procedure: COLONOSCOPY;  Surgeon: Andie Alonzo MD;  Location: [facility] ENDO;  Service: Endoscopy;  Laterality: N/A;    COLONOSCOPY, SCREENING, HIGH RISK PATIENT N/A 2025    Procedure: COLONOSCOPY, SCREENING, HIGH RISK PATIENT;  Surgeon: Shilo Galvan MD;  Location: [facility] ENDO;  Service: Endoscopy;  Laterality: N/A;    ORIF HUMERUS FRACTURE Left 2024    Procedure: ORIF, FRACTURE, HUMERUS;  Surgeon: Neeraj Mai MD;  Location: [facility] OR;  Service: Orthopedics;  Laterality: Left;  [place] long proximal humerus plates, c-arn. large frag set, beach chair     Social History[1]  Family History   Problem Relation Name Age of Onset    Menstrual problems Mother Noa Mujica     Arthritis Mother Noa Mujica     Asthma Mother Noa Mujica     Depression Mother Noa Mujica     Diabetes Mother Noa Mujica     Hypertension Mother Noa Mujica     Arthritis Father Raheem rAreola     Hearing loss Father Raheem Arreola     Hypertension Father Raheem Arreola     Stroke Father Raheem Arreola     Heart disease Neg Hx      Cancer Neg Hx      Breast cancer Neg Hx      Ovarian cancer Neg Hx      Colon cancer Neg Hx       OB History    Para Term  AB Living   1    1   SAB IAB Ectopic Multiple Live Births       1      # Outcome Date GA Lbr Prashanth/2nd Weight Sex Type Anes PTL Lv   1 Term     F CS-Unspec   MAXIMUS      Obstetric Comments    13/R/4   No abnormal pap   No STDs   Menopause 54         Current Medications[2]  Allergies: Keflex [cephalexin], Pcn [penicillins], and Wellbutrin [bupropion hcl]     ROS:  Constitutional: no weight loss, weight gain, fever, fatigue  Eyes:  No vision changes, glasses/contacts  ENT/Mouth: No ulcers, sinus problems, ears ringing, headache  Cardiovascular: No inability to lie flat, chest pain, exercise intolerance, swelling, heart palpitations  Respiratory: No wheezing, coughing blood, shortness of breath, or cough  Gastrointestinal: No diarrhea, bloody stool, nausea/vomiting, constipation, gas, hemorrhoids  Genitourinary: No blood in urine, painful urination, urgency of urination, frequency of urination, incomplete emptying, incontinence, abnormal bleeding, painful periods, heavy periods, vaginal discharge, vaginal odor, painful intercourse, sexual problems, bleeding after intercourse.  Musculoskeletal: No muscle weakness  Skin/Breast: No painful breasts, nipple discharge, masses, rash, ulcers  Neurological: No passing out, seizures, numbness, headache  Endocrine: No diabetes, hypothyroid, hyperthyroid, hot flashes, hair loss, abnormal hair growth, ance  Psychiatric: No depression, crying  Hematologic: No bruises, bleeding, swollen lymph nodes, anemia.      OBJECTIVE:   The patient appears well, alert, oriented x 3, in no distress.  BP (!) 136/92   Wt 88.4 kg (194 lb 14.2 oz)   LMP 10/01/2021 (Exact Date)   BMI 33.45 kg/m²   NECK: no thyromegaly, trachea midline  SKIN: no acne, striae, hirsutism  BREAST EXAM: breasts appear normal, no suspicious masses, no skin or nipple changes or axillary nodes, bilateral implants, no palpable abnormalities otherwise  ABDOMEN: no hernias, masses, or hepatosplenomegaly  GENITALIA: normal external genitalia, no erythema, no discharge  URETHRA: normal urethra, normal urethral meatus  VAGINA: mucosal atrophy  CERVIX: no lesions or cervical motion tenderness  UTERUS:  normal  ADNEXA: normal adnexa      ASSESSMENT:   well woman  1. Well woman exam with routine gynecological exam        PLAN:   Mammogram done  pap smear  return annually or prn  Significant atrophy discussed unsatisfactory paps and just doing rpt in 1yr but may rpt given h/o + HPV 16 and h/o lisette 1            [1]   Social History  Socioeconomic History    Marital status: Single   Tobacco Use    Smoking status: Never    Smokeless tobacco: Never   Substance and Sexual Activity    Alcohol use: No    Drug use: No    Sexual activity: Not Currently     Partners: Male     Birth control/protection: None   Social History Narrative    Working at [employer]. Daughter (16) lives at home with her. Active in sports her whole life. Is  from her .    [2]   Current Outpatient Medications   Medication Sig Dispense Refill    cetirizine (ZYRTEC) 10 MG tablet Take 1 tablet (10 mg total) by mouth once daily. 30 tablet 1    fluticasone propionate (FLONASE) 50 mcg/actuation nasal spray 1 spray (50 mcg total) by Each Nostril route once daily. 16 g 3    sumatriptan (IMITREX) 100 MG tablet Take 1 tablet (100 mg total) by mouth every 2 (two) hours as needed for Migraine (can take 2nd tablet 2 hours later (not to exceed 2 tablets in 24 hours)). 9 tablet 5    valsartan (DIOVAN) 80 MG tablet Take 1 tablet (80 mg total) by mouth once daily. 90 tablet 3     No current facility-administered medications for this visit.

## 2025-05-30 LAB
INSULIN SERPL-ACNC: NORMAL U[IU]/ML
LAB AP BETHESDA CATEGORY: NORMAL
LAB AP CLINICAL FINDINGS: NORMAL
LAB AP GYN ADDITIONAL FINDINGS: NORMAL
LAB AP LMP DATE: NORMAL
LAB AP OCHS PAP SPECIMEN ADEQUACY: NORMAL
LAB AP OHS PAP INTERPRETATION: NORMAL
LAB AP PAP DISCLAIMER COMMENTS: NORMAL
LAB AP PERFORMING LOCATION(S): NORMAL

## 2025-07-21 ENCOUNTER — PATIENT MESSAGE (OUTPATIENT)
Dept: ADMINISTRATIVE | Facility: HOSPITAL | Age: 60
End: 2025-07-21
Payer: COMMERCIAL

## 2025-07-22 ENCOUNTER — PATIENT OUTREACH (OUTPATIENT)
Dept: ADMINISTRATIVE | Facility: HOSPITAL | Age: 60
End: 2025-07-22
Payer: COMMERCIAL

## 2025-07-22 NOTE — LETTER
AUTHORIZATION FOR RELEASE OF   CONFIDENTIAL INFORMATION        We are seeing Yasmine Villa, date of birth 1965, in the clinic at [facility]. Lynda Bobo MD is the patient's PCP. Yasmine Villa has an outstanding lab/procedure at the time we reviewed her chart. In order to help keep her health information updated, she has authorized us to request the following medical record(s):        (  )  MAMMOGRAM                                      (  )  COLONOSCOPY      (  )  PAP SMEAR                                          (  )  OUTSIDE LAB RESULTS     ( x )  DEXA SCAN                                          (  )  EYE EXAM            (  )  FOOT EXAM                                          (  )  ENTIRE RECORD     (  )  OUTSIDE IMMUNIZATIONS                 (  )  _______________       Please fax records to [facility], Jordan, Jenna C., MD  Fax number is [phone].        If you have any questions, please contact, Ale Anguiano MA Care Coordinator at [phone]           Patient Name: Yasmine Villa  : 1965  Patient Phone #: [phone]

## (undated) DEVICE — GOWN POLY REINF X-LONG 2XL

## (undated) DEVICE — COVER OVERHEAD SURG LT BLUE

## (undated) DEVICE — Device

## (undated) DEVICE — COVER TABLE HVY DTY 60X90IN

## (undated) DEVICE — SPONGE COTTON TRAY 4X4IN

## (undated) DEVICE — K-WIRE TROCAR POINT 1.6X150MM
Type: IMPLANTABLE DEVICE | Site: ARM | Status: NON-FUNCTIONAL
Removed: 2024-09-19

## (undated) DEVICE — SPONGE LAP 18X18 PREWASHED

## (undated) DEVICE — GOWN POLY REINF BRTH SLV LG

## (undated) DEVICE — TOWEL OR XRAY BLUE 17X26IN

## (undated) DEVICE — DRAPE THREE-QTR REINF 53X77IN

## (undated) DEVICE — PACK SURGERY START

## (undated) DEVICE — ADHESIVE DERMABOND ADVANCED

## (undated) DEVICE — SUT SILK 0 STRANDS 30IN BLK

## (undated) DEVICE — CLOSURE SKIN STERI STRIP 1/2X4

## (undated) DEVICE — DRAPE C-ARM/MOBILE XRAY 44X80

## (undated) DEVICE — ELECTRODE REM PLYHSV RETURN 9

## (undated) DEVICE — DRAPE TIBURON ORTHOPEDIC SPLIT

## (undated) DEVICE — DRAPE U SPLIT SHEET 54X76IN

## (undated) DEVICE — INTERPULSE SET

## (undated) DEVICE — DRAPE STERI INSTRUMENT 1018

## (undated) DEVICE — SOL IRR NACL .9% 3000ML

## (undated) DEVICE — COVER PROXIMA MAYO STAND

## (undated) DEVICE — DRESSING COVER AQUACEL AG SURG

## (undated) DEVICE — GOWN POLY REINF BRTH SLV XL

## (undated) DEVICE — SLING ARM COMFT NAVY BLU MED

## (undated) DEVICE — GLOVE BIOGEL ORTHOPEDIC 8

## (undated) DEVICE — SUT CTD VICRYL 2.0

## (undated) DEVICE — YANKAUER OPEN TIP W/O VENT

## (undated) DEVICE — SUT 0 8-27IN VICRYL PL CT-1

## (undated) DEVICE — BLADE SURG CARBON STEEL #10

## (undated) DEVICE — PAD PREP CUFFED NS 24X48IN

## (undated) DEVICE — GLOVE BIOGEL PI MICRO INDIC 8

## (undated) DEVICE — PACK ORTHOPEDIC IV ECLIPSE

## (undated) DEVICE — DRAPE STERI U-SHAPED 47X51IN

## (undated) DEVICE — BNDG COFLEX FOAM LF2 ST 4X5YD

## (undated) DEVICE — APPLICATOR CHLORAPREP ORN 26ML

## (undated) DEVICE — SUT MONOCRYL 3-0 PS-2 UND

## (undated) DEVICE — DRAPE INCISE IOBAN 2 23X23IN

## (undated) DEVICE — DRAPE STERI-DRAPE 1000 17X11IN